# Patient Record
Sex: MALE | Race: BLACK OR AFRICAN AMERICAN | Employment: FULL TIME | URBAN - METROPOLITAN AREA
[De-identification: names, ages, dates, MRNs, and addresses within clinical notes are randomized per-mention and may not be internally consistent; named-entity substitution may affect disease eponyms.]

---

## 2017-02-20 ENCOUNTER — HOSPITAL ENCOUNTER (EMERGENCY)
Age: 27
Discharge: HOME | End: 2017-02-20
Attending: EMERGENCY MEDICINE
Payer: COMMERCIAL

## 2017-02-20 VITALS
BODY MASS INDEX: 28.84 KG/M2 | TEMPERATURE: 101 F | SYSTOLIC BLOOD PRESSURE: 128 MMHG | OXYGEN SATURATION: 95 % | DIASTOLIC BLOOD PRESSURE: 79 MMHG | HEIGHT: 71 IN | HEART RATE: 86 BPM | WEIGHT: 206 LBS | RESPIRATION RATE: 16 BRPM

## 2017-02-20 DIAGNOSIS — R50.9 FEVER, UNSPECIFIED FEVER CAUSE: ICD-10-CM

## 2017-02-20 DIAGNOSIS — J06.9 VIRAL UPPER RESPIRATORY TRACT INFECTION: Primary | ICD-10-CM

## 2017-02-20 PROCEDURE — 94640 AIRWAY INHALATION TREATMENT: CPT

## 2017-02-20 PROCEDURE — 99283 EMERGENCY DEPT VISIT LOW MDM: CPT

## 2017-02-20 PROCEDURE — 74011250637 HC RX REV CODE- 250/637: Performed by: EMERGENCY MEDICINE

## 2017-02-20 RX ORDER — IBUPROFEN 400 MG/1
800 TABLET ORAL
Status: COMPLETED | OUTPATIENT
Start: 2017-02-20 | End: 2017-02-20

## 2017-02-20 RX ORDER — ALBUTEROL SULFATE 90 UG/1
2 AEROSOL, METERED RESPIRATORY (INHALATION)
Status: COMPLETED | OUTPATIENT
Start: 2017-02-20 | End: 2017-02-20

## 2017-02-20 RX ORDER — HYDROCODONE POLISTIREX AND CHLORPHENIRAMINE POLISTIREX 10; 8 MG/5ML; MG/5ML
5 SUSPENSION, EXTENDED RELEASE ORAL
Qty: 115 ML | Refills: 0 | Status: SHIPPED | OUTPATIENT
Start: 2017-02-20 | End: 2018-07-15

## 2017-02-20 RX ORDER — ACETAMINOPHEN 500 MG
1000 TABLET ORAL
Status: COMPLETED | OUTPATIENT
Start: 2017-02-20 | End: 2017-02-20

## 2017-02-20 RX ORDER — PSEUDOEPHEDRINE HCL 30 MG
30 TABLET ORAL
Qty: 20 TAB | Refills: 0 | Status: SHIPPED | OUTPATIENT
Start: 2017-02-20 | End: 2018-07-15

## 2017-02-20 RX ORDER — ALBUTEROL SULFATE 90 UG/1
2 AEROSOL, METERED RESPIRATORY (INHALATION)
Qty: 1 INHALER | Refills: 0 | Status: SHIPPED | OUTPATIENT
Start: 2017-02-20 | End: 2020-02-26

## 2017-02-20 RX ADMIN — ALBUTEROL SULFATE 2 PUFF: 90 AEROSOL, METERED RESPIRATORY (INHALATION) at 19:31

## 2017-02-20 RX ADMIN — ACETAMINOPHEN 1000 MG: 500 TABLET, FILM COATED ORAL at 19:01

## 2017-02-20 RX ADMIN — IBUPROFEN 800 MG: 400 TABLET ORAL at 19:01

## 2017-02-20 NOTE — ED PROVIDER NOTES
HPI Comments: 6:12 PM Fatuma Pink is a 27 y.o. male with a h/o Smoking 1.5PPD presents to the ED with c/o URI onset 4 days ago. Pt states he used Dayquil, Mucinex, Ibuprofen 5 hrs ago, and Baking Soda for his abd pain. Pt reports malaise, dry cough, fever of 104F, chills, and light-headedness. Pt denies nausea, vomiting, diarrhea, chest pain, or SOB. All other sx denied. No other complaints at this time. PCP-None      Patient is a 27 y.o. male presenting with sore throat, chills, general illness, and cough. The history is provided by the patient. Sore Throat    Associated symptoms include cough. Pertinent negatives include no vomiting, no congestion, no headaches, no shortness of breath and no trouble swallowing. Chills    Associated symptoms include sore throat and cough. Pertinent negatives include no chest pain, no vomiting, no congestion, no headaches, no shortness of breath and no neck pain. Generalized Body Aches   Associated symptoms include abdominal pain. Pertinent negatives include no chest pain, no headaches and no shortness of breath. Cough   Associated symptoms include chills and sore throat. Pertinent negatives include no chest pain, no headaches, no myalgias, no shortness of breath, no wheezing, no nausea and no vomiting. Past Medical History:   Diagnosis Date    PTSD (post-traumatic stress disorder)        History reviewed. No pertinent past surgical history. No family history on file. Social History     Social History    Marital status: SINGLE     Spouse name: N/A    Number of children: N/A    Years of education: N/A     Occupational History    Not on file.      Social History Main Topics    Smoking status: Current Every Day Smoker     Packs/day: 1.50     Years: 17.00    Smokeless tobacco: Not on file    Alcohol use Yes    Drug use: No    Sexual activity: Yes     Other Topics Concern    Not on file     Social History Narrative         ALLERGIES: Review of patient's allergies indicates no known allergies. Review of Systems   Constitutional: Positive for chills and fever. Negative for appetite change. HENT: Positive for sore throat. Negative for congestion, sinus pressure and trouble swallowing. Eyes: Negative for pain and visual disturbance. Respiratory: Positive for cough. Negative for chest tightness, shortness of breath and wheezing. Cardiovascular: Negative for chest pain, palpitations and leg swelling. Gastrointestinal: Positive for abdominal pain. Negative for nausea and vomiting. Endocrine: Negative. Genitourinary: Negative. Musculoskeletal: Negative for arthralgias, back pain, myalgias and neck pain. Skin: Negative. Allergic/Immunologic: Negative. Neurological: Positive for light-headedness. Negative for dizziness, syncope, numbness and headaches. Hematological: Negative. Psychiatric/Behavioral: Negative. All other systems reviewed and are negative. Vitals:    02/20/17 1810   BP: 128/79   Pulse: 86   Resp: 16   Temp: (!) 101.9 °F (38.8 °C)   SpO2: 95%   Weight: 93.4 kg (206 lb)   Height: 5' 11\" (1.803 m)            Physical Exam   Constitutional: He is oriented to person, place, and time. He appears well-developed and well-nourished. No distress. Non meningismus. HENT:   Head: Normocephalic and atraumatic. Right Ear: External ear normal. A middle ear effusion is present. Left Ear: External ear normal. A middle ear effusion is present. Mouth/Throat: Oropharynx is clear and moist and mucous membranes are normal. No trismus in the jaw. No oropharyngeal exudate, posterior oropharyngeal edema, posterior oropharyngeal erythema or tonsillar abscesses. Eyes: Conjunctivae and EOM are normal. Pupils are equal, round, and reactive to light. Neck: Normal range of motion. Neck supple. No JVD present. No tracheal deviation present. No thyromegaly present. No cervical lymphadenopathy.     Cardiovascular: Normal rate, regular rhythm and normal heart sounds. Exam reveals no gallop and no friction rub. No murmur heard. Pulmonary/Chest: Effort normal and breath sounds normal. No respiratory distress. He has no decreased breath sounds. He has no wheezes. He has no rales. He exhibits no tenderness. Abdominal: Soft. Bowel sounds are normal. He exhibits no distension. There is no tenderness. There is no rebound. Musculoskeletal: Normal range of motion. He exhibits no edema or tenderness. Lymphadenopathy:     He has no cervical adenopathy. Neurological: He is alert and oriented to person, place, and time. No cranial nerve deficit. Skin: Skin is warm. He is not diaphoretic. Psychiatric: He has a normal mood and affect. His behavior is normal. Judgment and thought content normal.   Nursing note and vitals reviewed. MDM  Number of Diagnoses or Management Options  Fever, unspecified fever cause:   Viral upper respiratory tract infection:   Diagnosis management comments: 6:21 PM  URI congestion body aches, and persistent fever    Tx regiment for taking Motrin and Tylenol simultaneously discussed. Pseudofed and albuterol added to tx plan. ED Course       Procedures    Vitals:  Patient Vitals for the past 12 hrs:   Temp Pulse Resp BP SpO2   17 1810 (!) 101.9 °F (38.8 °C) 86 16 128/79 95 %         Medications ordered:   Medications   ibuprofen (MOTRIN) tablet 800 mg (not administered)   acetaminophen (TYLENOL) tablet 1,000 mg (not administered)         Lab findings:  No results found for this or any previous visit (from the past 12 hour(s)). X-Ray, CT or other radiology findings or impressions:  No orders to display       Orders:  Orders Placed This Encounter    inhalational spacing device     Si Each by Does Not Apply route as needed for Cough (with inhaler).      Dispense:  1 Device     Refill:  0    chlorpheniramine-HYDROcodone (TUSSIONEX PENNKINETIC ER) 10-8 mg/5 mL suspension     Sig: Take 5 mL by mouth every twelve (12) hours as needed for Cough. Max Daily Amount: 10 mL. Dispense:  115 mL     Refill:  0    albuterol (PROVENTIL HFA, VENTOLIN HFA, PROAIR HFA) 90 mcg/actuation inhaler     Sig: Take 2 Puffs by inhalation every four (4) hours as needed for Wheezing. Dispense:  1 Inhaler     Refill:  0    pseudoephedrine (SUDAFED) 30 mg tablet     Sig: Take 1 Tab by mouth every six (6) hours as needed for Congestion. Dispense:  20 Tab     Refill:  0    ibuprofen (MOTRIN) tablet 800 mg    acetaminophen (TYLENOL) tablet 1,000 mg       Reevaluation, Progress notes, Consult notes, or additional Procedure notes:   6:58 PM I have assessed the patient who will be discharged in stable condition. Patient is to return to emergency department if any new or worsening condition. I have given the patient instructions for discharge and follow-up. Patient understands and verbalizes agreement with plan, diagnosis, discharge, and follow-up. Disposition:  Diagnosis:   1. Viral upper respiratory tract infection    2. Fever, unspecified fever cause        Disposition:     Follow-up Information     Follow up With Details Comments Contact Info    None   None ([phone]) Patient stated that they have no PCP      [facility] Call Follow Up From Emergency Department [address]  [address] [phone]           Patient's Medications   Start Taking    ALBUTEROL (PROVENTIL HFA, VENTOLIN HFA, PROAIR HFA) 90 MCG/ACTUATION INHALER    Take 2 Puffs by inhalation every four (4) hours as needed for Wheezing. CHLORPHENIRAMINE-HYDROCODONE (TUSSIONEX PENNKINETIC ER) 10-8 MG/5 ML SUSPENSION    Take 5 mL by mouth every twelve (12) hours as needed for Cough. Max Daily Amount: 10 mL. INHALATIONAL SPACING DEVICE    1 Each by Does Not Apply route as needed for Cough (with inhaler).     PSEUDOEPHEDRINE (SUDAFED) 30 MG TABLET    Take 1 Tab by mouth every six (6) hours as needed for Congestion. Continue Taking    METHOCARBAMOL (ROBAXIN-750) 750 MG TABLET    Take 1 Tab by mouth three (3) times daily. These Medications have changed    No medications on file   Stop Taking    No medications on file     Bhanu Del Real for and in the presence of Crosby Gowers, MD 6:12 PM, 02/20/17. Physician Attestation  I personally performed the services described in the documentation, reviewed the documentation, as recorded by the scribe in my presence, and it accurately and completely records my words and actions.     Crosby Gowers, MD 6:12 PM 02/20/17        Signed by : Bhanu Vinson, 02/20/17 at 6:12 PM

## 2017-02-21 NOTE — DISCHARGE INSTRUCTIONS
Learning About Fever  What is a fever? A fever is a high body temperature. It's one way your body fights being sick. A fever shows that the body is responding to infection or other illnesses, both minor and severe. A fever is a symptom, not an illness by itself. A fever can be a sign that you are ill, but most fevers are not caused by a serious problem. You may have a fever with a minor illness, such as a cold. But sometimes a very serious infection may cause little or no fever. It is important to look at other symptoms, other conditions you have, and how you feel in general. In children, notice how they act and see what symptoms they complain of. What is a normal body temperature? A normal body temperature is about 98. 6ºF. Some people have a normal temperature that is a little higher or a little lower than this. Your temperature may be a little lower in the morning than it is later in the day. It may go up during hot weather or when you exercise, wear heavy clothes, or take a hot bath. Your temperature may also be different depending on how you take it. A temperature taken in the mouth (oral) or under the arm may be a little lower than your core temperature (rectal). What is a fever temperature? A core temperature of 100.4°F or above is considered a fever. What can cause a fever? A fever may be caused by:  · Infections. This is the most common cause of a fever. Examples of infections that can cause a fever include the flu, a kidney infection, or pneumonia. · Some medicines. · Severe trauma or injury, such as a heart attack, stroke, heatstroke, or burns. · Other medical conditions, such as arthritis and some cancers. How can you treat a fever at home? · Ask your doctor if you can take an over-the-counter pain medicine, such as acetaminophen (Tylenol), ibuprofen (Advil, Motrin), or naproxen (Aleve). Be safe with medicines. Read and follow all instructions on the label.   · To prevent dehydration, drink plenty of fluids. Choose water and other caffeine-free clear liquids until you feel better. If you have kidney, heart, or liver disease and have to limit fluids, talk with your doctor before you increase the amount of fluids you drink. Follow-up care is a key part of your treatment and safety. Be sure to make and go to all appointments, and call your doctor if you are having problems. It's also a good idea to know your test results and keep a list of the medicines you take. Where can you learn more? Go to http://sg-naa.info/. Enter D099 in the search box to learn more about \"Learning About Fever. \"  Current as of: May 27, 2016  Content Version: 11.1  © 2269-2128 [employer]. Care instructions adapted under license by [employer] (which disclaims liability or warranty for this information). If you have questions about a medical condition or this instruction, always ask your healthcare professional. [place] [address] any warranty or liability for your use of this information. Upper Respiratory Infection (Cold): Care Instructions  Your Care Instructions    An upper respiratory infection, or URI, is an infection of the nose, sinuses, or throat. URIs are spread by coughs, sneezes, and direct contact. The common cold is the most frequent kind of URI. The flu and sinus infections are other kinds of URIs. Almost all URIs are caused by viruses. Antibiotics won't cure them. But you can treat most infections with home care. This may include drinking lots of fluids and taking over-the-counter pain medicine. You will probably feel better in 4 to 10 days. The doctor has checked you carefully, but problems can develop later. If you notice any problems or new symptoms, get medical treatment right away. Follow-up care is a key part of your treatment and safety.  Be sure to make and go to all appointments, and call your doctor if you are having problems. It's also a good idea to know your test results and keep a list of the medicines you take. How can you care for yourself at home? · To prevent dehydration, drink plenty of fluids, enough so that your urine is light yellow or clear like water. Choose water and other caffeine-free clear liquids until you feel better. If you have kidney, heart, or liver disease and have to limit fluids, talk with your doctor before you increase the amount of fluids you drink. · Take an over-the-counter pain medicine, such as acetaminophen (Tylenol), ibuprofen (Advil, Motrin), or naproxen (Aleve). Read and follow all instructions on the label. · Before you use cough and cold medicines, check the label. These medicines may not be safe for young children or for people with certain health problems. · Be careful when taking over-the-counter cold or flu medicines and Tylenol at the same time. Many of these medicines have acetaminophen, which is Tylenol. Read the labels to make sure that you are not taking more than the recommended dose. Too much acetaminophen (Tylenol) can be harmful. · Get plenty of rest.  · Do not smoke or allow others to smoke around you. If you need help quitting, talk to your doctor about stop-smoking programs and medicines. These can increase your chances of quitting for good. When should you call for help? Call 911 anytime you think you may need emergency care. For example, call if:  · You have severe trouble breathing. Call your doctor now or seek immediate medical care if:  · You seem to be getting much sicker. · You have new or worse trouble breathing. · You have a new or higher fever. · You have a new rash. Watch closely for changes in your health, and be sure to contact your doctor if:  · You have a new symptom, such as a sore throat, an earache, or sinus pain. · You cough more deeply or more often, especially if you notice more mucus or a change in the color of your mucus.   · You do not get better as expected. Where can you learn more? Go to http://sg-naa.info/. Enter M718 in the search box to learn more about \"Upper Respiratory Infection (Cold): Care Instructions. \"  Current as of: June 30, 2016  Content Version: 11.1  © 3213-4542 [employer]. Care instructions adapted under license by [employer] (which disclaims liability or warranty for this information). If you have questions about a medical condition or this instruction, always ask your healthcare professional. [address] any warranty or liability for your use of this information.

## 2017-07-19 ENCOUNTER — APPOINTMENT (OUTPATIENT)
Dept: GENERAL RADIOLOGY | Age: 27
End: 2017-07-19
Attending: PHYSICIAN ASSISTANT
Payer: COMMERCIAL

## 2017-07-19 ENCOUNTER — HOSPITAL ENCOUNTER (EMERGENCY)
Age: 27
Discharge: HOME | End: 2017-07-19
Attending: EMERGENCY MEDICINE
Payer: COMMERCIAL

## 2017-07-19 VITALS
TEMPERATURE: 98.5 F | HEART RATE: 56 BPM | SYSTOLIC BLOOD PRESSURE: 102 MMHG | BODY MASS INDEX: 28.82 KG/M2 | HEIGHT: 71 IN | RESPIRATION RATE: 18 BRPM | DIASTOLIC BLOOD PRESSURE: 81 MMHG | OXYGEN SATURATION: 100 % | WEIGHT: 205.9 LBS

## 2017-07-19 DIAGNOSIS — K62.5 RECTAL BLEEDING: Primary | ICD-10-CM

## 2017-07-19 DIAGNOSIS — K92.1 BLOODY STOOL: ICD-10-CM

## 2017-07-19 DIAGNOSIS — K59.00 CONSTIPATION, UNSPECIFIED CONSTIPATION TYPE: ICD-10-CM

## 2017-07-19 LAB
ANION GAP BLD CALC-SCNC: 5 MMOL/L (ref 3–18)
APTT PPP: 30 SEC (ref 23–36.4)
BASOPHILS # BLD AUTO: 0 K/UL (ref 0–0.06)
BASOPHILS # BLD: 1 % (ref 0–2)
BUN SERPL-MCNC: 22 MG/DL (ref 7–18)
BUN/CREAT SERPL: 19 (ref 12–20)
CALCIUM SERPL-MCNC: 9.2 MG/DL (ref 8.5–10.1)
CHLORIDE SERPL-SCNC: 107 MMOL/L (ref 100–108)
CO2 SERPL-SCNC: 26 MMOL/L (ref 21–32)
CREAT SERPL-MCNC: 1.18 MG/DL (ref 0.6–1.3)
DIFFERENTIAL METHOD BLD: NORMAL
EOSINOPHIL # BLD: 0.3 K/UL (ref 0–0.4)
EOSINOPHIL NFR BLD: 4 % (ref 0–5)
ERYTHROCYTE [DISTWIDTH] IN BLOOD BY AUTOMATED COUNT: 12.6 % (ref 11.6–14.5)
GLUCOSE SERPL-MCNC: 108 MG/DL (ref 74–99)
HCT VFR BLD AUTO: 42.6 % (ref 36–48)
HGB BLD-MCNC: 14 G/DL (ref 13–16)
INR PPP: 0.9 (ref 0.8–1.2)
LYMPHOCYTES # BLD AUTO: 29 % (ref 21–52)
LYMPHOCYTES # BLD: 1.9 K/UL (ref 0.9–3.6)
MCH RBC QN AUTO: 28.7 PG (ref 24–34)
MCHC RBC AUTO-ENTMCNC: 32.9 G/DL (ref 31–37)
MCV RBC AUTO: 87.3 FL (ref 74–97)
MONOCYTES # BLD: 0.5 K/UL (ref 0.05–1.2)
MONOCYTES NFR BLD AUTO: 7 % (ref 3–10)
NEUTS SEG # BLD: 3.9 K/UL (ref 1.8–8)
NEUTS SEG NFR BLD AUTO: 59 % (ref 40–73)
PLATELET # BLD AUTO: 201 K/UL (ref 135–420)
PMV BLD AUTO: 10.1 FL (ref 9.2–11.8)
POTASSIUM SERPL-SCNC: 4 MMOL/L (ref 3.5–5.5)
PROTHROMBIN TIME: 12 SEC (ref 11.5–15.2)
RBC # BLD AUTO: 4.88 M/UL (ref 4.7–5.5)
SODIUM SERPL-SCNC: 138 MMOL/L (ref 136–145)
WBC # BLD AUTO: 6.6 K/UL (ref 4.6–13.2)

## 2017-07-19 PROCEDURE — 85610 PROTHROMBIN TIME: CPT | Performed by: EMERGENCY MEDICINE

## 2017-07-19 PROCEDURE — 74020 XR ABD FLAT/ ERECT: CPT

## 2017-07-19 PROCEDURE — 80048 BASIC METABOLIC PNL TOTAL CA: CPT | Performed by: EMERGENCY MEDICINE

## 2017-07-19 PROCEDURE — 99282 EMERGENCY DEPT VISIT SF MDM: CPT

## 2017-07-19 PROCEDURE — 85025 COMPLETE CBC W/AUTO DIFF WBC: CPT | Performed by: EMERGENCY MEDICINE

## 2017-07-19 PROCEDURE — 85730 THROMBOPLASTIN TIME PARTIAL: CPT | Performed by: EMERGENCY MEDICINE

## 2017-07-19 RX ORDER — POLYETHYLENE GLYCOL 3350 17 G/17G
17 POWDER, FOR SOLUTION ORAL DAILY
Qty: 289 G | Refills: 0 | Status: SHIPPED | OUTPATIENT
Start: 2017-07-19 | End: 2018-07-15

## 2017-07-19 RX ORDER — HYDROCORTISONE ACETATE 25 MG/1
25 SUPPOSITORY RECTAL EVERY 12 HOURS
Qty: 12 EACH | Refills: 0 | Status: SHIPPED | OUTPATIENT
Start: 2017-07-19 | End: 2018-07-15

## 2017-07-19 NOTE — ED NOTES
Provider in triage: 27 y o with rectal bleeding for three days, shows picture of toilet water tinged with red blood. No lightheadedness.    Ordered: labs  DDx: rectal bleed, given age, more likely hemorrhoid or benign process  Sent to (MAIN treatment area, FAST track): fast

## 2017-07-19 NOTE — ED NOTES
Pt stable observed looking at movie on phone, no distress noted and no complaints voiced.  Pt awaiting discharge

## 2017-07-19 NOTE — ED PROVIDER NOTES
HPI Comments: 27 yr old male presents to the ED complaining of episodes of bright red bloody stool and rectal bleeding with bowel movements x 3 days. Pt states he has had some mild discomfort in the center of the abdomen off and on for a few days. Denies diarrhea or constipation. No other complaints. Patient is a 27 y.o. male presenting with melena. Melena   Associated symptoms include abdominal pain. Pertinent negatives include no chest pain, no headaches and no shortness of breath. Past Medical History:   Diagnosis Date    PTSD (post-traumatic stress disorder)        Past Surgical History:   Procedure Laterality Date    HX ORTHOPAEDIC      nose reconstruction    HX OTHER SURGICAL      testicle surgery         History reviewed. No pertinent family history. Social History     Social History    Marital status: SINGLE     Spouse name: N/A    Number of children: N/A    Years of education: N/A     Occupational History    Not on file. Social History Main Topics    Smoking status: Current Every Day Smoker     Packs/day: 1.50     Years: 17.00    Smokeless tobacco: Current User    Alcohol use Yes    Drug use: No    Sexual activity: Yes     Partners: Female     Other Topics Concern    Not on file     Social History Narrative         ALLERGIES: Review of patient's allergies indicates no known allergies. Review of Systems   Constitutional: Negative. Negative for chills, diaphoresis, fatigue and fever. HENT: Negative. Negative for congestion, ear pain, rhinorrhea and sore throat. Eyes: Negative. Negative for pain and redness. Respiratory: Negative. Negative for cough, shortness of breath, wheezing and stridor. Cardiovascular: Negative. Negative for chest pain, palpitations and leg swelling. Gastrointestinal: Positive for abdominal pain, anal bleeding, blood in stool and melena. Negative for constipation, diarrhea, nausea and vomiting. Endocrine: Negative.     Genitourinary: Negative. Negative for dysuria, flank pain, frequency and hematuria. Musculoskeletal: Negative. Negative for back pain, myalgias, neck pain and neck stiffness. Skin: Negative. Negative for rash and wound. Allergic/Immunologic: Negative. Neurological: Negative. Negative for dizziness, seizures, syncope and headaches. Hematological: Negative. Psychiatric/Behavioral: Negative. All other systems reviewed and are negative. Vitals:    07/19/17 1154   BP: 130/85   Pulse: 60   Resp: 20   Temp: 98.5 °F (36.9 °C)   SpO2: 99%   Weight: 93.4 kg (205 lb 14.4 oz)   Height: 5' 11\" (1.803 m)            Physical Exam   Constitutional: He is oriented to person, place, and time. He appears well-developed and well-nourished. No distress. HENT:   Head: Normocephalic. Neck: Normal range of motion. Neck supple. Cardiovascular: Normal rate, regular rhythm and normal heart sounds. Exam reveals no gallop and no friction rub. No murmur heard. Pulmonary/Chest: Effort normal and breath sounds normal. No stridor. No respiratory distress. He has no wheezes. He has no rales. Abdominal: Soft. Bowel sounds are normal. He exhibits no distension and no mass. There is no tenderness. There is no rebound and no guarding. Genitourinary: Rectal exam shows guaiac positive stool. Genitourinary Comments: No external hemorrhoids or evidence of impaction noted on rectal exam, guaiac positive, no grossly visible blood noted   Musculoskeletal: Normal range of motion. Neurological: He is alert and oriented to person, place, and time. Coordination normal.   Skin: Skin is warm and dry. No rash noted. He is not diaphoretic. No erythema. Psychiatric: He has a normal mood and affect. His behavior is normal. Thought content normal.   Nursing note and vitals reviewed.        MDM  Number of Diagnoses or Management Options  Diagnosis management comments: Impression:  Rectal bleeding, constipation    CBC unremarkable, glucose 108, BUN 22, PT/aPTT/INR unremarkable  X-ray abd moderate amount of stool burden noted throughout the colon    Explained to the pt that he likely has internal hemorrhoids related to constipation, no anemia noted on labs, will recommend close GI follow-up. Patient is stable for discharge at this time. Rx for miralax and anusol suppositories given. Rest and follow-up with GI this week. Return to the ED immediately for any new or worsening sx.   Agueda Stevens PA-C 1:58 PM            Amount and/or Complexity of Data Reviewed  Clinical lab tests: ordered and reviewed  Tests in the radiology section of CPT®: reviewed and ordered    Risk of Complications, Morbidity, and/or Mortality  Presenting problems: low  Diagnostic procedures: low  Management options: low    Patient Progress  Patient progress: stable    ED Course       Procedures

## 2017-07-19 NOTE — ED NOTES
I have reviewed discharge instructions with the patient. The patient verbalized understanding.   Iv discontinued, pt ambulated out of ED in stable condition with no distress noted and no complaints voiced

## 2017-07-19 NOTE — DISCHARGE INSTRUCTIONS
Constipation: Care Instructions  Your Care Instructions  Constipation means that you have a hard time passing stools (bowel movements). People pass stools from 3 times a day to once every 3 days. What is normal for you may be different. Constipation may occur with pain in the rectum and cramping. The pain may get worse when you try to pass stools. Sometimes there are small amounts of bright red blood on toilet paper or the surface of stools. This is because of enlarged veins near the rectum (hemorrhoids). A few changes in your diet and lifestyle may help you avoid ongoing constipation. Your doctor may also prescribe medicine to help loosen your stool. Some medicines can cause constipation. These include pain medicines and antidepressants. Tell your doctor about all the medicines you take. Your doctor may want to make a medicine change to ease your symptoms. Follow-up care is a key part of your treatment and safety. Be sure to make and go to all appointments, and call your doctor if you are having problems. It's also a good idea to know your test results and keep a list of the medicines you take. How can you care for yourself at home? · Drink plenty of fluids, enough so that your urine is light yellow or clear like water. If you have kidney, heart, or liver disease and have to limit fluids, talk with your doctor before you increase the amount of fluids you drink. · Include high-fiber foods in your diet each day. These include fruits, vegetables, beans, and whole grains. · Get at least 30 minutes of exercise on most days of the week. Walking is a good choice. You also may want to do other activities, such as running, swimming, cycling, or playing tennis or team sports. · Take a fiber supplement, such as Citrucel or Metamucil, every day. Read and follow all instructions on the label. · Schedule time each day for a bowel movement. A daily routine may help.  Take your time having your bowel movement. · Support your feet with a small step stool when you sit on the toilet. This helps flex your hips and places your pelvis in a squatting position. · Your doctor may recommend an over-the-counter laxative to relieve your constipation. Examples are Milk of Magnesia and MiraLax. Read and follow all instructions on the label. Do not use laxatives on a long-term basis. When should you call for help? Call your doctor now or seek immediate medical care if:  · You have new or worse belly pain. · You have new or worse nausea or vomiting. · You have blood in your stools. Watch closely for changes in your health, and be sure to contact your doctor if:  · Your constipation is getting worse. · You do not get better as expected. Where can you learn more? Go to http://sg-naa.info/. Enter 21 [phone] in the search box to learn more about \"Constipation: Care Instructions. \"  Current as of: March 20, 2017  Content Version: 11.3  © 4186-3016 [employer]. Care instructions adapted under license by [employer] (which disclaims liability or warranty for this information). If you have questions about a medical condition or this instruction, always ask your healthcare professional. [address] any warranty or liability for your use of this information. StudentFunder Activation    Thank you for requesting access to Student[employer]under. Please follow the instructions below to securely access and download your online medical record. StudentFunder allows you to send messages to your doctor, view your test results, renew your prescriptions, schedule appointments, and more. How Do I Sign Up? 1. In your internet browser, go to [email or web address]  2. Click on the First Time User? Click Here link in the Sign In box. You will be redirect to the New Member Sign Up page. 3. Enter your StudentFunder Access Code exactly as it appears below.  You will not need to use this code after youve completed the sign-up process. If you do not sign up before the expiration date, you must request a new code. Yoomly Access Code: [email or web address]  Expires: 10/17/2017  1:59 PM (This is the date your Yoomly access code will )    4. Enter the last four digits of your Social Security Number (xxxx) and Date of Birth (mm/dd/yyyy) as indicated and click Submit. You will be taken to the next sign-up page. 5. Create a Yoomly ID. This will be your Yoomly login ID and cannot be changed, so think of one that is secure and easy to remember. 6. Create a Yoomly password. You can change your password at any time. 7. Enter your Password Reset Question and Answer. This can be used at a later time if you forget your password. 8. Enter your e-mail address. You will receive e-mail notification when new information is available in [address]. 9. Click Sign Up. You can now view and download portions of your medical record. 10. Click the Download Summary menu link to download a portable copy of your medical information. Additional Information    If you have questions, please visit the Frequently Asked Questions section of the Yoomly website at [email or web address] Remember, Yoomly is NOT to be used for urgent needs. For medical emergencies, dial 911.

## 2018-01-03 ENCOUNTER — HOSPITAL ENCOUNTER (EMERGENCY)
Age: 28
Discharge: HOME | End: 2018-01-03
Attending: EMERGENCY MEDICINE | Admitting: EMERGENCY MEDICINE
Payer: COMMERCIAL

## 2018-01-03 VITALS
HEIGHT: 71 IN | DIASTOLIC BLOOD PRESSURE: 81 MMHG | TEMPERATURE: 98.3 F | SYSTOLIC BLOOD PRESSURE: 136 MMHG | WEIGHT: 218.6 LBS | RESPIRATION RATE: 18 BRPM | BODY MASS INDEX: 30.6 KG/M2 | HEART RATE: 88 BPM | OXYGEN SATURATION: 100 %

## 2018-01-03 DIAGNOSIS — Z00.00 ENCOUNTER FOR WELL ADULT EXAM WITHOUT ABNORMAL FINDINGS: Primary | ICD-10-CM

## 2018-01-03 LAB
APPEARANCE UR: CLEAR
BILIRUB UR QL: NEGATIVE
COLOR UR: YELLOW
GLUCOSE UR STRIP.AUTO-MCNC: NEGATIVE MG/DL
HGB UR QL STRIP: NEGATIVE
KETONES UR QL STRIP.AUTO: NEGATIVE MG/DL
LEUKOCYTE ESTERASE UR QL STRIP.AUTO: NEGATIVE
NITRITE UR QL STRIP.AUTO: NEGATIVE
PH UR STRIP: 5.5 [PH] (ref 5–8)
PROT UR STRIP-MCNC: NEGATIVE MG/DL
SP GR UR REFRACTOMETRY: 1.03 (ref 1–1.03)
UROBILINOGEN UR QL STRIP.AUTO: 0.2 EU/DL (ref 0.2–1)

## 2018-01-03 PROCEDURE — 99283 EMERGENCY DEPT VISIT LOW MDM: CPT

## 2018-01-03 PROCEDURE — 87491 CHLMYD TRACH DNA AMP PROBE: CPT | Performed by: PHYSICIAN ASSISTANT

## 2018-01-03 PROCEDURE — 81003 URINALYSIS AUTO W/O SCOPE: CPT | Performed by: PHYSICIAN ASSISTANT

## 2018-01-03 NOTE — ED PROVIDER NOTES
HPI Comments: Jeyson Serrano is a 32 y.o. Male with hx of PTSD  that presents to the ED with a complaint of concern for STD. Pt states that he SO has been dealing with BV and yeast infections for the past month and he is concerned that he may have something or may have caught something from her. Pt states that he has some itching 'every now and then' and that he has been going to the bathroom more often for the past couple weeks. He denies painful urination, discharge, abdominal pain, fever, NVD, CP and SOB. Patient is a 32 y.o. male presenting with STD exposure. Exposure to STD   Pertinent negatives include no chest pain, no abdominal pain, no headaches and no shortness of breath. Past Medical History:   Diagnosis Date    PTSD (post-traumatic stress disorder)        Past Surgical History:   Procedure Laterality Date    HX ORTHOPAEDIC      nose reconstruction    HX OTHER SURGICAL      testicle surgery         No family history on file. Social History     Social History    Marital status: SINGLE     Spouse name: N/A    Number of children: N/A    Years of education: N/A     Occupational History    Not on file. Social History Main Topics    Smoking status: Current Every Day Smoker     Packs/day: 1.50     Years: 17.00    Smokeless tobacco: Current User    Alcohol use Yes    Drug use: No    Sexual activity: Yes     Partners: Female     Other Topics Concern    Not on file     Social History Narrative         ALLERGIES: Review of patient's allergies indicates no known allergies. Review of Systems   Constitutional: Negative for fatigue and fever. HENT: Negative for congestion. Respiratory: Negative for shortness of breath. Cardiovascular: Negative for chest pain. Gastrointestinal: Negative for abdominal pain, diarrhea, nausea and vomiting. Genitourinary: Positive for frequency. Negative for difficulty urinating and flank pain.    Musculoskeletal: Negative for back pain.   Skin: Negative for wound. Neurological: Negative for dizziness and headaches. All other systems reviewed and are negative. Vitals:    01/03/18 1729   BP: 136/81   Pulse: 88   Resp: 18   Temp: 98.3 °F (36.8 °C)   SpO2: 100%   Weight: 99.2 kg (218 lb 9.6 oz)   Height: 5' 11\" (1.803 m)            Physical Exam   Constitutional: He is oriented to person, place, and time. He appears well-developed and well-nourished. No distress. HENT:   Head: Normocephalic and atraumatic. Nose: Nose normal.   Eyes: Conjunctivae are normal. Pupils are equal, round, and reactive to light. Neck: Normal range of motion. Cardiovascular: Normal rate, regular rhythm and normal heart sounds. Pulmonary/Chest: Effort normal and breath sounds normal. No respiratory distress. He has no wheezes. Abdominal: Bowel sounds are normal. There is no tenderness. Genitourinary: Testes normal and penis normal. Circumcised. No phimosis, paraphimosis, hypospadias, penile erythema or penile tenderness. Musculoskeletal: Normal range of motion. Neurological: He is alert and oriented to person, place, and time. Skin: Skin is warm and dry. Psychiatric: He has a normal mood and affect. Vitals reviewed. MDM  Number of Diagnoses or Management Options  Encounter for well adult exam without abnormal findings:   Diagnosis management comments: Labs Reviewed  URINALYSIS W/ RFLX MICROSCOPIC  CHLAMYDIA/NEISSERIA AMPLIFICATION      Discussed etiology of BV and Yeast infections with pt and SO. Discussed prophylactic treatment for G/C,  SO states last G/C was negative. Pt states declines treatment at this time.       Impression: well adult exam    Plan: discharge home  We will call with results of pending labs  Follow up with PCP       Amount and/or Complexity of Data Reviewed  Clinical lab tests: ordered and reviewed    Risk of Complications, Morbidity, and/or Mortality  Presenting problems: low  Diagnostic procedures: low  Management options: low    Patient Progress  Patient progress: stable    ED Course       Procedures                   Vitals:  Patient Vitals for the past 12 hrs:   Temp Pulse Resp BP SpO2   01/03/18 1729 98.3 °F (36.8 °C) 88 18 136/81 100 %         Medications ordered:   Medications - No data to display      Lab findings:  Recent Results (from the past 12 hour(s))   URINALYSIS W/ RFLX MICROSCOPIC    Collection Time: 01/03/18  5:35 PM   Result Value Ref Range    Color YELLOW      Appearance CLEAR      Specific gravity 1.029 1.005 - 1.030      pH (UA) 5.5 5.0 - 8.0      Protein NEGATIVE  NEG mg/dL    Glucose NEGATIVE  NEG mg/dL    Ketone NEGATIVE  NEG mg/dL    Bilirubin NEGATIVE  NEG      Blood NEGATIVE  NEG      Urobilinogen 0.2 0.2 - 1.0 EU/dL    Nitrites NEGATIVE  NEG      Leukocyte Esterase NEGATIVE  NEG             X-Ray, CT or other radiology findings or impressions:  No orders to display       Progress notes, Consult notes or additional Procedure notes:       Disposition:  Diagnosis:   1. Encounter for well adult exam without abnormal findings        Disposition: discharge    Follow-up Information     Follow up With Details Comments [facility] Call As needed, follow up [address] [facility] EM[facility]PT  If symptoms worsen [address] [place] [address] [address] [address]  [phone]           Patient's Medications   Start Taking    No medications on file   Continue Taking    ALBUTEROL (PROVENTIL HFA, VENTOLIN HFA, PROAIR HFA) 90 MCG/ACTUATION INHALER    Take 2 Puffs by inhalation every four (4) hours as needed for Wheezing. CHLORPHENIRAMINE-HYDROCODONE (TUSSIONEX PENNKINETIC ER) 10-8 MG/5 ML SUSPENSION    Take 5 mL by mouth every twelve (12) hours as needed for Cough. Max Daily Amount: 10 mL. HYDROCORTISONE (ANUSOL-HC) 25 MG SUPP    Insert 1 Suppository into rectum every twelve (12) hours.     INHALATIONAL SPACING DEVICE    1 Each by Does Not Apply route as needed for Cough (with inhaler). METHOCARBAMOL (ROBAXIN-750) 750 MG TABLET    Take 1 Tab by mouth three (3) times daily. POLYETHYLENE GLYCOL (MIRALAX) 17 GRAM/DOSE POWDER    Take 17 g by mouth daily. 1 tablespoon with 8 oz of water daily    PSEUDOEPHEDRINE (SUDAFED) 30 MG TABLET    Take 1 Tab by mouth every six (6) hours as needed for Congestion.    These Medications have changed    No medications on file   Stop Taking    No medications on file

## 2018-01-04 NOTE — DISCHARGE INSTRUCTIONS

## 2018-01-06 LAB
C TRACH RRNA SPEC QL NAA+PROBE: NEGATIVE
N GONORRHOEA RRNA SPEC QL NAA+PROBE: NEGATIVE
SPECIMEN SOURCE: NORMAL

## 2018-07-15 ENCOUNTER — HOSPITAL ENCOUNTER (EMERGENCY)
Age: 28
Discharge: HOME | End: 2018-07-15
Attending: EMERGENCY MEDICINE
Payer: COMMERCIAL

## 2018-07-15 ENCOUNTER — APPOINTMENT (OUTPATIENT)
Dept: GENERAL RADIOLOGY | Age: 28
End: 2018-07-15
Attending: EMERGENCY MEDICINE
Payer: COMMERCIAL

## 2018-07-15 VITALS
HEIGHT: 71 IN | RESPIRATION RATE: 16 BRPM | WEIGHT: 220 LBS | HEART RATE: 51 BPM | OXYGEN SATURATION: 98 % | DIASTOLIC BLOOD PRESSURE: 98 MMHG | SYSTOLIC BLOOD PRESSURE: 151 MMHG | BODY MASS INDEX: 30.8 KG/M2 | TEMPERATURE: 99 F

## 2018-07-15 DIAGNOSIS — F17.200 NICOTINE USE DISORDER: ICD-10-CM

## 2018-07-15 DIAGNOSIS — K62.5 RECTAL BLEEDING: Primary | ICD-10-CM

## 2018-07-15 DIAGNOSIS — R03.0 ELEVATED BLOOD PRESSURE READING WITHOUT DIAGNOSIS OF HYPERTENSION: ICD-10-CM

## 2018-07-15 LAB
ABO + RH BLD: NORMAL
ALBUMIN SERPL-MCNC: 3.9 G/DL (ref 3.4–5)
ALBUMIN/GLOB SERPL: 1.1 {RATIO} (ref 0.8–1.7)
ALP SERPL-CCNC: 68 U/L (ref 45–117)
ALT SERPL-CCNC: 38 U/L (ref 16–61)
ANION GAP SERPL CALC-SCNC: 7 MMOL/L (ref 3–18)
AST SERPL-CCNC: 30 U/L (ref 15–37)
BASOPHILS # BLD: 0 K/UL (ref 0–0.1)
BASOPHILS NFR BLD: 0 % (ref 0–2)
BILIRUB SERPL-MCNC: 0.4 MG/DL (ref 0.2–1)
BLOOD GROUP ANTIBODIES SERPL: NORMAL
BUN SERPL-MCNC: 17 MG/DL (ref 7–18)
BUN/CREAT SERPL: 14 (ref 12–20)
CALCIUM SERPL-MCNC: 9 MG/DL (ref 8.5–10.1)
CHLORIDE SERPL-SCNC: 106 MMOL/L (ref 100–108)
CO2 SERPL-SCNC: 26 MMOL/L (ref 21–32)
CREAT SERPL-MCNC: 1.23 MG/DL (ref 0.6–1.3)
DIFFERENTIAL METHOD BLD: ABNORMAL
EOSINOPHIL # BLD: 0.3 K/UL (ref 0–0.4)
EOSINOPHIL NFR BLD: 3 % (ref 0–5)
ERYTHROCYTE [DISTWIDTH] IN BLOOD BY AUTOMATED COUNT: 12.4 % (ref 11.6–14.5)
GLOBULIN SER CALC-MCNC: 3.5 G/DL (ref 2–4)
GLUCOSE SERPL-MCNC: 101 MG/DL (ref 74–99)
HCT VFR BLD AUTO: 39.3 % (ref 36–48)
HGB BLD-MCNC: 13.6 G/DL (ref 13–16)
LYMPHOCYTES # BLD: 1.7 K/UL (ref 0.9–3.6)
LYMPHOCYTES NFR BLD: 16 % (ref 21–52)
MCH RBC QN AUTO: 29.1 PG (ref 24–34)
MCHC RBC AUTO-ENTMCNC: 34.6 G/DL (ref 31–37)
MCV RBC AUTO: 84.2 FL (ref 74–97)
MONOCYTES # BLD: 0.7 K/UL (ref 0.05–1.2)
MONOCYTES NFR BLD: 7 % (ref 3–10)
NEUTS SEG # BLD: 7.8 K/UL (ref 1.8–8)
NEUTS SEG NFR BLD: 74 % (ref 40–73)
PLATELET # BLD AUTO: 215 K/UL (ref 135–420)
PMV BLD AUTO: 10.3 FL (ref 9.2–11.8)
POTASSIUM SERPL-SCNC: 3.9 MMOL/L (ref 3.5–5.5)
PROT SERPL-MCNC: 7.4 G/DL (ref 6.4–8.2)
RBC # BLD AUTO: 4.67 M/UL (ref 4.7–5.5)
SODIUM SERPL-SCNC: 139 MMOL/L (ref 136–145)
SPECIMEN EXP DATE BLD: NORMAL
WBC # BLD AUTO: 10.6 K/UL (ref 4.6–13.2)

## 2018-07-15 PROCEDURE — 99282 EMERGENCY DEPT VISIT SF MDM: CPT

## 2018-07-15 PROCEDURE — 80053 COMPREHEN METABOLIC PANEL: CPT | Performed by: EMERGENCY MEDICINE

## 2018-07-15 PROCEDURE — 86900 BLOOD TYPING SEROLOGIC ABO: CPT | Performed by: EMERGENCY MEDICINE

## 2018-07-15 PROCEDURE — 85025 COMPLETE CBC W/AUTO DIFF WBC: CPT | Performed by: EMERGENCY MEDICINE

## 2018-07-15 PROCEDURE — 74018 RADEX ABDOMEN 1 VIEW: CPT

## 2018-07-15 RX ORDER — POLYETHYLENE GLYCOL 3350 17 G/17G
17 POWDER, FOR SOLUTION ORAL
Qty: 238 G | Refills: 0 | Status: SHIPPED | OUTPATIENT
Start: 2018-07-15 | End: 2020-02-26

## 2018-07-15 NOTE — DISCHARGE INSTRUCTIONS
Elevated Blood Pressure: Care Instructions  Your Care Instructions    Blood pressure is a measure of how hard the blood pushes against the walls of your arteries. It's normal for blood pressure to go up and down throughout the day. But if it stays up over time, you have high blood pressure. Two numbers tell you your blood pressure. The first number is the systolic pressure. It shows how hard the blood pushes when your heart is pumping. The second number is the diastolic pressure. It shows how hard the blood pushes between heartbeats, when your heart is relaxed and filling with blood. An ideal blood pressure in adults is less than 120/80 (say \"120 over 80\"). High blood pressure is 140/90 or higher. You have high blood pressure if your top number is 140 or higher or your bottom number is 90 or higher, or both. The main test for high blood pressure is simple, fast, and painless. To diagnose high blood pressure, your doctor will test your blood pressure at different times. After testing your blood pressure, your doctor may ask you to test it again when you are home. If you are diagnosed with high blood pressure, you can work with your doctor to make a long-term plan to manage it. Follow-up care is a key part of your treatment and safety. Be sure to make and go to all appointments, and call your doctor if you are having problems. It's also a good idea to know your test results and keep a list of the medicines you take. How can you care for yourself at home? · Do not smoke. Smoking increases your risk for heart attack and stroke. If you need help quitting, talk to your doctor about stop-smoking programs and medicines. These can increase your chances of quitting for good. · Stay at a healthy weight. · Try to limit how much sodium you eat to less than 2,300 milligrams (mg) a day. Your doctor may ask you to try to eat less than 1,500 mg a day. · Be physically active.  Get at least 30 minutes of exercise on most days of the week. Walking is a good choice. You also may want to do other activities, such as running, swimming, cycling, or playing tennis or team sports. · Avoid or limit alcohol. Talk to your doctor about whether you can drink any alcohol. · Eat plenty of fruits, vegetables, and low-fat dairy products. Eat less saturated and total fats. · Learn how to check your blood pressure at home. When should you call for help? Call your doctor now or seek immediate medical care if:  ? · Your blood pressure is much higher than normal (such as 180/110 or higher). ? · You think high blood pressure is causing symptoms such as:  ¨ Severe headache. ¨ Blurry vision. ? Watch closely for changes in your health, and be sure to contact your doctor if:  ? · You do not get better as expected. Where can you learn more? Go to http://sgSennarinaa.info/. Enter Z635 in the search box to learn more about \"Elevated Blood Pressure: Care Instructions. \"  Current as of: September 21, 2016  Content Version: 11.4  © 9649-4805 [employer]. Care instructions adapted under license by [employer] (which disclaims liability or warranty for this information). If you have questions about a medical condition or this instruction, always ask your healthcare professional. [address] any warranty or liability for your use of this information. Learning About Benefits From Quitting Smoking  How does quitting smoking make you healthier? If you're thinking about quitting smoking, you may have a few reasons to be smoke-free. Your health may be one of them. · When you quit smoking, you lower your risks for cancer, lung disease, heart attack, stroke, blood vessel disease, and blindness from macular degeneration. · When you're smoke-free, you get sick less often, and you heal faster. You are less likely to get colds, flu, bronchitis, and pneumonia.   · As a nonsmoker, you may find that your mood is better and you are less stressed. When and how will you feel healthier? Quitting has real health benefits that start from day 1 of being smoke-free. And the longer you stay smoke-free, the healthier you get and the better you feel. The first hours  · After just 20 minutes, your blood pressure and heart rate go down. That means there's less stress on your heart and blood vessels. · Within 12 hours, the level of carbon monoxide in your blood drops back to normal. That makes room for more oxygen. With more oxygen in your body, you may notice that you have more energy than when you smoked. After 2 weeks  · Your lungs start to work better. · Your risk of heart attack starts to drop. After 1 month  · When your lungs are clear, you cough less and breathe deeper, so it's easier to be active. · Your sense of taste and smell return. That means you can enjoy food more than you have since you started smoking. Over the years  · After 1 year, your risk of heart disease is half what it would be if you kept smoking. · After 5 years, your risk of stroke starts to shrink. Within a few years after that, it's about the same as if you'd never smoked. · After 10 years, your risk of dying from lung cancer is cut by about half. And your risk for many other types of cancer is lower too. How would quitting help others in your life? When you quit smoking, you improve the health of everyone who now breathes in your smoke. · Their heart, lung, and cancer risks drop, much like yours. · They are sick less. For babies and small children, living smoke-free means they're less likely to have ear infections, pneumonia, and bronchitis. · If you're a woman who is or will be pregnant someday, quitting smoking means a healthier . · Children who are close to you are less likely to become adult smokers. Where can you learn more? Go to http://sg-naa.info/.   Enter [phone] in the search box to learn more about \"Learning About Benefits From Quitting Smoking. \"  Current as of: November 29, 2017  Content Version: 11.7  © 7126-5762 [employer]. Care instructions adapted under license by [employer] (which disclaims liability or warranty for this information). If you have questions about a medical condition or this instruction, always ask your healthcare professional. [address] any warranty or liability for your use of this information. Rectal Bleeding: Care Instructions  Your Care Instructions    Rectal bleeding in small amounts is common. You may see red spotting on toilet paper or drops of blood in the toilet. Rectal bleeding has many possible causes, from something as minor as hemorrhoids to something as serious as colon cancer. You may need more tests to find the cause of your bleeding. Follow-up care is a key part of your treatment and safety. Be sure to make and go to all appointments, and call your doctor if you are having problems. It's also a good idea to know your test results and keep a list of the medicines you take. How can you care for yourself at home? · Avoid aspirin and other nonsteroidal anti-inflammatory drugs (NSAIDs), such as ibuprofen (Advil, Motrin) and naproxen (Aleve). They can cause you to bleed more. Ask your doctor if you can take acetaminophen (Tylenol). Read and follow all instructions on the label. · Use a stool softener that contains bran or psyllium. You can save money by buying bran or psyllium (available in bulk at most health food stores) and sprinkling it on foods or stirring it into fruit juice. You can also use a product such as Metamucil or Citrucel. · Take your medicines exactly as directed. Call your doctor if you think you are having a problem with your medicine. When should you call for help? Call 911 anytime you think you may need emergency care.  For example, call if:    · You passed out (lost consciousness).    Call your doctor now or seek immediate medical care if:    · You have new or worse pain.     · You have new or worse bleeding from the rectum.     · You are dizzy or light-headed, or you feel like you may faint.    Watch closely for changes in your health, and be sure to contact your doctor if:    · You cannot pass stools or gas.     · You do not get better as expected. Where can you learn more? Go to http://sg-naa.info/. Enter B764 in the search box to learn more about \"Rectal Bleeding: Care Instructions. \"  Current as of: May 12, 2017  Content Version: 11.7  © 1803-8456 [employer]. Care instructions adapted under license by [employer] (which disclaims liability or warranty for this information). If you have questions about a medical condition or this instruction, always ask your healthcare professional. [address] any warranty or liability for your use of this information.

## 2018-07-15 NOTE — ED TRIAGE NOTES
Patient states that since this morning around 0300 he has been having diarrhea that is just blood. Dark in color, he is also stating that it feels like someone is squeezing his intestines.

## 2018-07-15 NOTE — ED PROVIDER NOTES
EMERGENCY DEPARTMENT HISTORY AND PHYSICAL EXAM    6:02 PM      Date: 7/15/2018  Patient Name: Craig Mistry    History of Presenting Illness     Chief Complaint   Patient presents with    Anal Bleeding         History Provided By: Patient    Chief Complaint: Rectal Bleed  Duration: 15 Hours  Timing:  Intermittent  Location: Rectal  Quality:   Severity: Moderate  Modifying Factors: denies NSAID drug use  Associated Symptoms: constipation, diarrhea, abdominal pain, rectal pain      Additional History (Context): Craig Mistry is a 32 y.o. male with hx of PTSD who presents with c/o intermittent moderate rectal bleeding onset 15 hours. Pt reports drinking vodka last night prior to onset of sx with constipation last night and intermittent episodes of diarrhea with large amounts of blood present and abdominal and rectal pain. Denies use of NSAIDs. Denies being on blood thinners. Denies hx of hemorrhoids. Pt states having similar sx several years prior. PCP: None      Past History     Past Medical History:  Past Medical History:   Diagnosis Date    PTSD (post-traumatic stress disorder)        Past Surgical History:  Past Surgical History:   Procedure Laterality Date    HX ORTHOPAEDIC      nose reconstruction    HX OTHER SURGICAL      testicle surgery       Family History:  No family history on file. Social History:  Social History   Substance Use Topics    Smoking status: Current Every Day Smoker     Packs/day: 1.50     Years: 17.00    Smokeless tobacco: Current User    Alcohol use Yes       Allergies:  No Known Allergies      Review of Systems       Review of Systems   Constitutional: Negative for fever. HENT: Negative for sore throat. Eyes: Negative for redness and visual disturbance. Respiratory: Negative for shortness of breath and wheezing. Cardiovascular: Negative for chest pain. Gastrointestinal: Positive for abdominal pain, anal bleeding, blood in stool and constipation. Negative for diarrhea, nausea, rectal pain and vomiting. Endocrine: Negative for polyuria. Genitourinary: Negative for dysuria. Musculoskeletal: Negative for arthralgias and neck stiffness. Skin: Negative for rash. Neurological: Negative for dizziness, syncope, weakness and headaches. All other systems reviewed and are negative. Physical Exam     Visit Vitals    BP (!) 151/98 (BP 1 Location: Left arm, BP Patient Position: At rest;Sitting)    Pulse (!) 51    Temp 99 °F (37.2 °C)    Resp 16    Ht 5' 11\" (1.803 m)    Wt 99.8 kg (220 lb)    SpO2 98%    BMI 30.68 kg/m2         Physical Exam   Constitutional: He is oriented to person, place, and time. He appears well-developed and well-nourished. No distress. HENT:   Head: Normocephalic and atraumatic. Mouth/Throat: Oropharynx is clear and moist.   Eyes: Conjunctivae are normal. Pupils are equal, round, and reactive to light. No scleral icterus. Neck: Normal range of motion. Neck supple. Cardiovascular: Intact distal pulses. Capillary refill < 3 seconds   Pulmonary/Chest: Effort normal and breath sounds normal. No respiratory distress. He has no wheezes. Abdominal: Soft. Bowel sounds are normal. He exhibits no distension and no mass. There is no tenderness. There is no rebound and no guarding. Genitourinary: Rectal exam shows guaiac positive stool. Rectal exam shows no external hemorrhoid and anal tone normal.   Genitourinary Comments: No gross blood in rectum  Good rectal tone   Musculoskeletal: Normal range of motion. He exhibits no edema. Lymphadenopathy:     He has no cervical adenopathy. Neurological: He is alert and oriented to person, place, and time. No cranial nerve deficit. He exhibits normal muscle tone. Coordination normal.   Skin: Skin is warm and dry. He is not diaphoretic. Psychiatric: Thought content normal.   Nursing note and vitals reviewed.         Diagnostic Study Results     Labs -  Recent Results (from the past 12 hour(s))   CBC WITH AUTOMATED DIFF    Collection Time: 07/15/18  5:36 PM   Result Value Ref Range    WBC 10.6 4.6 - 13.2 K/uL    RBC 4.67 (L) 4.70 - 5.50 M/uL    HGB 13.6 13.0 - 16.0 g/dL    HCT 39.3 36.0 - 48.0 %    MCV 84.2 74.0 - 97.0 FL    MCH 29.1 24.0 - 34.0 PG    MCHC 34.6 31.0 - 37.0 g/dL    RDW 12.4 11.6 - 14.5 %    PLATELET 409 405 - 283 K/uL    MPV 10.3 9.2 - 11.8 FL    NEUTROPHILS 74 (H) 40 - 73 %    LYMPHOCYTES 16 (L) 21 - 52 %    MONOCYTES 7 3 - 10 %    EOSINOPHILS 3 0 - 5 %    BASOPHILS 0 0 - 2 %    ABS. NEUTROPHILS 7.8 1.8 - 8.0 K/UL    ABS. LYMPHOCYTES 1.7 0.9 - 3.6 K/UL    ABS. MONOCYTES 0.7 0.05 - 1.2 K/UL    ABS. EOSINOPHILS 0.3 0.0 - 0.4 K/UL    ABS. BASOPHILS 0.0 0.0 - 0.1 K/UL    DF AUTOMATED     METABOLIC PANEL, COMPREHENSIVE    Collection Time: 07/15/18  5:36 PM   Result Value Ref Range    Sodium 139 136 - 145 mmol/L    Potassium 3.9 3.5 - 5.5 mmol/L    Chloride 106 100 - 108 mmol/L    CO2 26 21 - 32 mmol/L    Anion gap 7 3.0 - 18 mmol/L    Glucose 101 (H) 74 - 99 mg/dL    BUN 17 7.0 - 18 MG/DL    Creatinine 1.23 0.6 - 1.3 MG/DL    BUN/Creatinine ratio 14 12 - 20      GFR est AA >60 >60 ml/min/1.73m2    GFR est non-AA >60 >60 ml/min/1.73m2    Calcium 9.0 8.5 - 10.1 MG/DL    Bilirubin, total 0.4 0.2 - 1.0 MG/DL    ALT (SGPT) 38 16 - 61 U/L    AST (SGOT) 30 15 - 37 U/L    Alk. phosphatase 68 45 - 117 U/L    Protein, total 7.4 6.4 - 8.2 g/dL    Albumin 3.9 3.4 - 5.0 g/dL    Globulin 3.5 2.0 - 4.0 g/dL    A-G Ratio 1.1 0.8 - 1.7     TYPE & SCREEN    Collection Time: 07/15/18  5:36 PM   Result Value Ref Range    Crossmatch Expiration 07/18/2018     ABO/Rh(D) O POSITIVE     Antibody screen NEG        Radiologic Studies -   XR ABD (KUB)    (Results Pending)   KUB: ED Provider Interpretation - Nonspecific bowel gas. No free air. No obstruction. 6:36 PM        Medical Decision Making   I am the first provider for this patient.     I reviewed the vital signs, available nursing notes, past medical history, past surgical history, family history and social history. Vital Signs-Reviewed the patient's vital signs. Pulse Oximetry Analysis -  98% on room air (Interpretation) Normal    Cardiac Monitor:  Rate: 51 bpm  Rhythm:  Sinus Bradycardia    Records Reviewed: Nursing Notes (Time of Review: 5:57 PM)    Provider Notes (Medical Decision Making):  MDM  Number of Diagnoses or Management Options  Elevated blood pressure reading without diagnosis of hypertension:   Nicotine use disorder:   Rectal bleeding:   Diagnosis management comments: DDX: Hemorrhoids, bleeding ulcer, constipation, other GI bleed causes. Will check labs, rectal exam. No abdominal tenderness on deep palpation. Pt admits to intermittent constipation but no diarrhea. Amount and/or Complexity of Data Reviewed  Clinical lab tests: ordered and reviewed  Tests in the radiology section of CPT®: ordered and reviewed    Patient Progress  Patient progress: stable      Medications - No data to display        ED Course: Progress Notes, Reevaluation, and Consults:  6:37 PM - KUB shows no free air and no obstruction per my preliminary read. Will discharge pt home with Miralax and have follow up with GI. Hemoglobin normal. Hx of constipation in past. With these sx's he admits to straining when on toilet. Encouraged pt to increase fiber, and to quit smoking. No hx HTN, has elevated BP, asymptomatic. Gave PCP and GI referral and to get BP recheck by pcp. Diagnosis     Clinical Impression:   1. Rectal bleeding    2. Elevated blood pressure reading without diagnosis of hypertension    3.  Nicotine use disorder        Disposition: Discharge    Follow-up Information     Follow up With Details Comments Contact Info    Whit Vásquez MD Call in 2 days  [address] [facility]      Familia Thornton MD Call in 1 week  [address] [facility] DEPT  As needed, If symptoms worsen [address] [facility] [address]  [phone]           Patient's Medications   Start Taking    POLYETHYLENE GLYCOL (MIRALAX) 17 GRAM/DOSE POWDER    Take 17 g by mouth daily as needed. 1 tablespoon with 8 oz of water daily  Indications: constipation   Continue Taking    ALBUTEROL (PROVENTIL HFA, VENTOLIN HFA, PROAIR HFA) 90 MCG/ACTUATION INHALER    Take 2 Puffs by inhalation every four (4) hours as needed for Wheezing. INHALATIONAL SPACING DEVICE    1 Each by Does Not Apply route as needed for Cough (with inhaler). These Medications have changed    No medications on file   Stop Taking    CHLORPHENIRAMINE-HYDROCODONE (TUSSIONEX PENNKINETIC ER) 10-8 MG/5 ML SUSPENSION    Take 5 mL by mouth every twelve (12) hours as needed for Cough. Max Daily Amount: 10 mL. HYDROCORTISONE (ANUSOL-HC) 25 MG SUPP    Insert 1 Suppository into rectum every twelve (12) hours. METHOCARBAMOL (ROBAXIN-750) 750 MG TABLET    Take 1 Tab by mouth three (3) times daily. POLYETHYLENE GLYCOL (MIRALAX) 17 GRAM/DOSE POWDER    Take 17 g by mouth daily. 1 tablespoon with 8 oz of water daily    PSEUDOEPHEDRINE (SUDAFED) 30 MG TABLET    Take 1 Tab by mouth every six (6) hours as needed for Congestion. _______________________________    Attestations:  Scribe Attestation     Jessee Brittle acting as a scribe for and in the presence of Luke Smart DO      July 15, 2018 at 6:10 PM       Provider Attestation:      I personally performed the services described in the documentation, reviewed the documentation, as recorded by the scribe in my presence, and it accurately and completely records my words and actions.  July 15, 2018 at 6:10 PM - Luke Smart DO    _______________________________

## 2018-08-27 ENCOUNTER — HOSPITAL ENCOUNTER (EMERGENCY)
Age: 28
Discharge: HOME | End: 2018-08-27
Attending: EMERGENCY MEDICINE
Payer: COMMERCIAL

## 2018-08-27 VITALS
TEMPERATURE: 98.6 F | RESPIRATION RATE: 16 BRPM | HEART RATE: 59 BPM | OXYGEN SATURATION: 100 % | DIASTOLIC BLOOD PRESSURE: 84 MMHG | SYSTOLIC BLOOD PRESSURE: 141 MMHG

## 2018-08-27 DIAGNOSIS — G43.809 OTHER MIGRAINE WITHOUT STATUS MIGRAINOSUS, NOT INTRACTABLE: Primary | ICD-10-CM

## 2018-08-27 PROCEDURE — 99282 EMERGENCY DEPT VISIT SF MDM: CPT

## 2018-08-27 NOTE — LETTER
NOTIFICATION RETURN TO WORK / SCHOOL 
 
8/27/2018 4:45 PM 
 
Mr. Tamie Barnes 
[address] [facility] [address] To Whom It May Concern: 
 
Tamie Barnes is currently under the care of [facility] EMERGENCY DEPT. He will return to work/school on: 8/30/18 If there are questions or concerns please have the patient contact our office. Sincerely, Dana Jama PA-C

## 2018-08-27 NOTE — ED PROVIDER NOTES
EMERGENCY DEPARTMENT HISTORY AND PHYSICAL EXAM    4:06 PM      Date: 8/27/2018  Patient Name: Riya Hagan    History of Presenting Illness     Chief Complaint   Patient presents with    Migraine         History Provided By: Patient    Chief Complaint: Mild HA  Duration:  Hours ago this morning  Timing:  Improving  Location: Generalized  Quality: Aching  Severity: Mild  Modifying Factors: Tylenol improved  Associated Symptoms: Mild lightheadedness, posterior neck pain       Additional History (Context): Riya Hagan is a 25 y.o. male with PTSD who presents with complaints of an improving headache with acute onset hours ago this morning while on the way to work. Patient reports Tylenol improved his headache greatly, but notes he needs a work note. Patient denies this is the worst headache of his life and reports this headache is similar to his past ones. He states that with onset, the headache lasted for about 4 hours. He also reports lightheadedness that has greatly improved. He denies fever or dizziness but notes posterior neck pain that usually accompanies his headache. Denies any further complaints or symptoms at the moment. PCP: None    Current Outpatient Prescriptions   Medication Sig Dispense Refill    polyethylene glycol (MIRALAX) 17 gram/dose powder Take 17 g by mouth daily as needed. 1 tablespoon with 8 oz of water daily  Indications: constipation 238 g 0    inhalational spacing device 1 Each by Does Not Apply route as needed for Cough (with inhaler). 1 Device 0    albuterol (PROVENTIL HFA, VENTOLIN HFA, PROAIR HFA) 90 mcg/actuation inhaler Take 2 Puffs by inhalation every four (4) hours as needed for Wheezing.  1 Inhaler 0       Past History     Past Medical History:  Past Medical History:   Diagnosis Date    PTSD (post-traumatic stress disorder)        Past Surgical History:  Past Surgical History:   Procedure Laterality Date    HX ORTHOPAEDIC      nose reconstruction    HX OTHER SURGICAL      testicle surgery       Family History:  No family history on file. Social History:  Social History   Substance Use Topics    Smoking status: Current Every Day Smoker     Packs/day: 1.50     Years: 17.00    Smokeless tobacco: Current User    Alcohol use Yes       Allergies:  No Known Allergies      Review of Systems     Review of Systems   Constitutional: Negative for chills and fever. Respiratory: Negative for shortness of breath. Cardiovascular: Negative for chest pain. Gastrointestinal: Negative for nausea and vomiting. Musculoskeletal: Positive for neck pain (posterior). Neurological: Positive for light-headedness (mild) and headaches (mild). Negative for dizziness. All other systems reviewed and are negative. Physical Exam     Visit Vitals    /84 (BP 1 Location: Left arm, BP Patient Position: Sitting)    Pulse (!) 59    Temp 98.6 °F (37 °C)    Resp 16    SpO2 100%       Physical Exam   Constitutional: He is oriented to person, place, and time. He appears well-developed and well-nourished. No distress. HENT:   Head: Normocephalic and atraumatic. Eyes: Conjunctivae and EOM are normal. Right eye exhibits no discharge. Left eye exhibits no discharge. No scleral icterus. Neck: Normal range of motion. Neck supple. No tracheal deviation present. Cardiovascular: Normal rate, regular rhythm and normal heart sounds. No murmur heard. Pulmonary/Chest: Effort normal and breath sounds normal. No respiratory distress. He has no wheezes. He has no rales. Abdominal: Soft. He exhibits no distension. There is no tenderness. There is no rebound and no guarding. Musculoskeletal: Normal range of motion. He exhibits no edema or deformity. Neurological: He is alert and oriented to person, place, and time. He has normal strength. He is not disoriented. No cranial nerve deficit or sensory deficit. Coordination and gait normal.   CN2-12 intact.   no nystagmus, neg pronator drift, neg romberg, neg LE drift. Normal gait. Normal finger to nose, heel to shin    Skin: Skin is warm and dry. He is not diaphoretic. Psychiatric: He has a normal mood and affect. His behavior is normal. Judgment and thought content normal.   Nursing note and vitals reviewed. Diagnostic Study Results     Labs -  No results found for this or any previous visit (from the past 12 hour(s)). Radiologic Studies -   No orders to display       Medical Decision Making   I am the first provider for this patient. I reviewed the vital signs, available nursing notes, past medical history, past surgical history, family history and social history. Vital Signs-Reviewed the patient's vital signs. Pulse Oximetry Analysis -  100% on room air (Normal)    Cardiac Monitor:  Rate: 59 bpm  Rhythm: Sinus Bradycardia    Records Reviewed: Nursing Notes (Time of Review: 4:06 PM)    ED Course: Progress Notes, Reevaluation, and Consults:    Provider Notes (Medical Decision Making):   Pt here with recurrent resolved headache. No red flags including including first or worst h/a ever, new onset, >49yo, change in pattern, worsening headache, acute or sudden onset, onset with exertion, HIV status, cancer status, systemic sx. VSS, exam unremarkable. Will d/c home with work return. Return sx discussed. Diagnosis     Clinical Impression:   1. Other migraine without status migrainosus, not intractable        Disposition: Discharge     Follow-up Information     Follow up With Details Comments Contact Info    None   None ([phone]) Patient stated that they have no PCP      [facility]   [address] [facility] [id number] [phone]           Patient's Medications   Start Taking    No medications on file   Continue Taking    ALBUTEROL (PROVENTIL HFA, VENTOLIN HFA, PROAIR HFA) 90 MCG/ACTUATION INHALER    Take 2 Puffs by inhalation every four (4) hours as needed for Wheezing.     INHALATIONAL SPACING DEVICE 1 Each by Does Not Apply route as needed for Cough (with inhaler). POLYETHYLENE GLYCOL (MIRALAX) 17 GRAM/DOSE POWDER    Take 17 g by mouth daily as needed. 1 tablespoon with 8 oz of water daily  Indications: constipation   These Medications have changed    No medications on file   Stop Taking    No medications on file     _______________________________    Attestations:  Bhanu [address] [place] [address] acting as a scribe for and in the presence of Ron Telles      August 27, 2018 at 4:06 PM       Provider Attestation:      I personally performed the services described in the documentation, reviewed the documentation, as recorded by the scribe in my presence, and it accurately and completely records my words and actions.  August 27, 2018 at 4:06 PM - GRIS Telles  _______________________________

## 2018-08-27 NOTE — DISCHARGE INSTRUCTIONS
Migraine Headache: Care Instructions  Your Care Instructions  Migraines are painful, throbbing headaches that often start on one side of the head. They may cause nausea and vomiting and make you sensitive to light, sound, or smell. Without treatment, migraines can last from 4 hours to a few days. Medicines can help prevent migraines or stop them after they have started. Your doctor can help you find which ones work best for you. Follow-up care is a key part of your treatment and safety. Be sure to make and go to all appointments, and call your doctor if you are having problems. It's also a good idea to know your test results and keep a list of the medicines you take. How can you care for yourself at home? · Do not drive if you have taken a prescription pain medicine. · Rest in a quiet, dark room until your headache is gone. Close your eyes, and try to relax or go to sleep. Don't watch TV or read. · Put a cold, moist cloth or cold pack on the painful area for 10 to 20 minutes at a time. Put a thin cloth between the cold pack and your skin. · Use a warm, moist towel or a heating pad set on low to relax tight shoulder and neck muscles. · Have someone gently massage your neck and shoulders. · Take your medicines exactly as prescribed. Call your doctor if you think you are having a problem with your medicine. You will get more details on the specific medicines your doctor prescribes. · Be careful not to take pain medicine more often than the instructions allow. You could get worse or more frequent headaches when the medicine wears off. To prevent migraines  · Keep a headache diary so you can figure out what triggers your headaches. Avoiding triggers may help you prevent headaches. Record when each headache began, how long it lasted, and what the pain was like.  (Was it throbbing, aching, stabbing, or dull?) Write down any other symptoms you had with the headache, such as nausea, flashing lights or dark spots, or sensitivity to bright light or loud noise. Note if the headache occurred near your period. List anything that might have triggered the headache. Triggers may include certain foods (chocolate, cheese, wine) or odors, smoke, bright light, stress, or lack of sleep. · If your doctor has prescribed medicine for your migraines, take it as directed. You may have medicine that you take only when you get a migraine and medicine that you take all the time to help prevent migraines. ¨ If your doctor has prescribed medicine for when you get a headache, take it at the first sign of a migraine, unless your doctor has given you other instructions. ¨ If your doctor has prescribed medicine to prevent migraines, take it exactly as prescribed. Call your doctor if you think you are having a problem with your medicine. · Find healthy ways to deal with stress. Migraines are most common during or right after stressful times. Take time to relax before and after you do something that has caused a migraine in the past.  · Try to keep your muscles relaxed by keeping good posture. Check your jaw, face, neck, and shoulder muscles for tension. Try to relax them. When you sit at a desk, change positions often. And make sure to stretch for 30 seconds each hour. · Get plenty of sleep and exercise. · Eat meals on a regular schedule. Avoid foods and drinks that often trigger migraines. These include chocolate, alcohol (especially red wine and port), aspartame, monosodium glutamate (MSG), and some additives found in foods (such as hot dogs, rowley, cold cuts, aged cheeses, and pickled foods). · Limit caffeine. Don't drink too much coffee, tea, or soda. But don't quit caffeine suddenly. That can also give you migraines. · Do not smoke or allow others to smoke around you. If you need help quitting, talk to your doctor about stop-smoking programs and medicines. These can increase your chances of quitting for good.   · If you are taking birth control pills or hormone therapy, talk to your doctor about whether they are triggering your migraines. When should you call for help? Call 911 anytime you think you may need emergency care. For example, call if:    · You have signs of a stroke. These may include:  ¨ Sudden numbness, paralysis, or weakness in your face, arm, or leg, especially on only one side of your body. ¨ Sudden vision changes. ¨ Sudden trouble speaking. ¨ Sudden confusion or trouble understanding simple statements. ¨ Sudden problems with walking or balance. ¨ A sudden, severe headache that is different from past headaches.    Call your doctor now or seek immediate medical care if:    · You have new or worse nausea and vomiting.     · You have a new or higher fever.     · Your headache gets much worse.    Watch closely for changes in your health, and be sure to contact your doctor if:    · You are not getting better after 2 days (48 hours). Where can you learn more? Go to http://sg-naa.info/. Enter V577 in the search box to learn more about \"Migraine Headache: Care Instructions. \"  Current as of: October 9, 2017  Content Version: 11.7  © 5765-1228 [employer]. Care instructions adapted under license by [employer] (which disclaims liability or warranty for this information). If you have questions about a medical condition or this instruction, always ask your healthcare professional. [address] any warranty or liability for your use of this information.

## 2020-02-25 PROCEDURE — 99282 EMERGENCY DEPT VISIT SF MDM: CPT

## 2020-02-26 ENCOUNTER — HOSPITAL ENCOUNTER (EMERGENCY)
Age: 30
Discharge: HOME | End: 2020-02-26
Attending: EMERGENCY MEDICINE
Payer: COMMERCIAL

## 2020-02-26 VITALS
WEIGHT: 220 LBS | HEART RATE: 68 BPM | OXYGEN SATURATION: 99 % | TEMPERATURE: 98.4 F | BODY MASS INDEX: 30.68 KG/M2 | DIASTOLIC BLOOD PRESSURE: 71 MMHG | RESPIRATION RATE: 16 BRPM | SYSTOLIC BLOOD PRESSURE: 119 MMHG

## 2020-02-26 DIAGNOSIS — H16.133 WELDERS' KERATITIS OF BOTH EYES: Primary | ICD-10-CM

## 2020-02-26 PROCEDURE — 74011000250 HC RX REV CODE- 250: Performed by: EMERGENCY MEDICINE

## 2020-02-26 RX ORDER — OXYCODONE HYDROCHLORIDE 5 MG/1
5 TABLET ORAL
Qty: 12 TAB | Refills: 0 | Status: SHIPPED | OUTPATIENT
Start: 2020-02-26 | End: 2020-02-29

## 2020-02-26 RX ORDER — PROPARACAINE HYDROCHLORIDE 5 MG/ML
2 SOLUTION/ DROPS OPHTHALMIC
Status: COMPLETED | OUTPATIENT
Start: 2020-02-26 | End: 2020-02-26

## 2020-02-26 RX ORDER — TETRACAINE HYDROCHLORIDE 5 MG/ML
2 SOLUTION OPHTHALMIC
Status: DISCONTINUED | OUTPATIENT
Start: 2020-02-26 | End: 2020-02-26

## 2020-02-26 RX ORDER — SODIUM CHLORIDE 5 %
OINTMENT (GRAM) OPHTHALMIC (EYE)
Qty: 1 TUBE | Refills: 0 | Status: SHIPPED | OUTPATIENT
Start: 2020-02-26

## 2020-02-26 RX ADMIN — FLUORESCEIN SODIUM 2 STRIP: 1 STRIP OPHTHALMIC at 01:02

## 2020-02-26 RX ADMIN — PROPARACAINE HYDROCHLORIDE 2 DROP: 5 SOLUTION/ DROPS OPHTHALMIC at 01:34

## 2020-02-26 NOTE — DISCHARGE INSTRUCTIONS
Patient Education        UV Keratitis: Care Instructions  Your Care Instructions    Keratitis is a swelling of the cornea. The cornea is the outer, clear layer that covers the colored part of your eye and pupil. Ultraviolet light can burn your eye and cause keratitis. Bright sunlight can do this if you don't wear sunglasses that block ultraviolet light. This is especially true when the sun's rays reflect off snow or water, or when you look directly into the sun for a long time. The problem can also be caused by bright light from welding equipment (also known as \"'s flash\"), tanning booths, and sunlamps. These can burn your eyes if you don't wear eye protection. Your doctor may have put a few drops of medicine into your eye to prevent infection and scarring. Your doctor may also have given you an eye patch or a special type of contact lens to wear while your eye heals. It may take 24 hours after the burn to know how much of your eye was affected. Your vision may be blurry and your eye may hurt and feel irritated. These symptoms should start to get better within a few days. Follow-up care is a key part of your treatment and safety. Be sure to make and go to all appointments, and call your doctor if you are having problems. It is also a good idea to know your test results and keep a list of the medicines you take. How can you care for yourself at home? · If your doctor gave you eyedrops, use them as directed. Antibiotic eyedrops help prevent infection and scarring. Use the medicine for as long as your doctor tells you to, even if your eye starts to look and feel better. Keep the bottle tip clean. Do not let it touch the eye area. · Be sure to only use the eyedrops your doctor prescribed. Do not use over-the-counter eyedrops. They may make your symptoms worse. · To put in eyedrops:  ? Tilt your head back, and pull your lower eyelid down with one finger. ?  Drop or squirt the medicine inside the lower lid.  ? Close your eye for 30 to 60 seconds to let the drops move around. ? Do not touch the eyedropper tip to your eyelashes or any other surface. · Put a cold washcloth over your eye to help reduce swelling. Do this for 15 minutes 3 or 4 times a day for the first 24 to 48 hours after a burn to your eye. If you use a small ice pack, place a cloth between the ice and your skin. Do not use chemical cooling packs on or near your eye. If the pack leaks, the chemicals could cause more harm to your eye. After the swelling goes down, put a warm washcloth over your eye to help relieve pain. · Do not wear contact lenses or eye makeup until your doctor says it is okay. If you were wearing disposable contacts when the burn happened, throw them away. Use a new pair when your eye has healed and it is safe to wear them again. · Take an over-the-counter pain medicine, such as acetaminophen (Tylenol), ibuprofen (Advil, Motrin), or naproxen (Aleve), as needed. Read and follow all instructions on the label. · If you feel like you have something in your eye, don't rub it. This could cause more harm to your eye. To prevent ultraviolet burns to your eye:  · Wear sunglasses that block ultraviolet rays. Do this even on cloudy days and in the winter. · Protect your eyes from the sun's glare when you are boating, sunbathing, or skiing. · Wear a mask or goggles designed for welding if you are welding or are near someone who is welding. · Use goggles to protect your eyes when you use tanning booths or sunlamps. When should you call for help? Call your doctor now or seek immediate medical care if:    · You have signs of an eye infection, such as:  ? Pus or thick discharge coming from the eye.  ? Redness or swelling around the eye.  ?  A fever.     · You have new or worse eye pain.     · You have vision changes.     · It feels like there is something in your eye.     · Light hurts your eye.    Watch closely for changes in your health, and be sure to contact your doctor if:    · You do not get better as expected. Where can you learn more? Go to http://sg-naa.info/. Enter W079 in the search box to learn more about \"UV Keratitis: Care Instructions. \"  Current as of: May 5, 2019  Content Version: 12.2  © 9332-7513 [employer]. Care instructions adapted under license by [employer] (which disclaims liability or warranty for this information). If you have questions about a medical condition or this instruction, always ask your healthcare professional. [address] any warranty or liability for your use of this information.

## 2020-02-26 NOTE — LETTER
NOTIFICATION RETURN TO WORK / SCHOOL 
 
2/26/2020 2:24 AM 
 
Mr. Bola Penn 
[address] [facility] [address] To Whom It May Concern: 
 
Bola Penn is currently under the care of [facility]. He will return to work/school on: 3/1, unless he feels otherwise well prior to 3/1. Bola Penn may return to work/school with the following restrictions: None If there are questions or concerns please have the patient contact our office. Sincerely, Ctaina Deleon MD

## 2020-02-26 NOTE — ED PROVIDER NOTES
EMERGENCY DEPARTMENT HISTORY AND PHYSICAL EXAM      Date: 2/26/2020  Patient Name: Mainor Eckert    History of Presenting Illness     Chief Complaint   Patient presents with    Eye Injury       History (Context): Mainor Eckert is a 30 y.o. gentleman with no prior medical history, who works as a , presents with subacute onset, severe, persistent bilateral eye pain this been worsening over a day. 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 He was wearing eye protection. Feels like prior welders keratitis. Pain exacerbated by light. No associated symptoms    On review of systems, the patient denies fever, chills, rashes, headache    PCP: None        Past History     Past Medical History:  Past Medical History:   Diagnosis Date    PTSD (post-traumatic stress disorder)        Past Surgical History:  Past Surgical History:   Procedure Laterality Date    HX ORTHOPAEDIC      nose reconstruction    HX OTHER SURGICAL      testicle surgery       Family History:  History reviewed. No pertinent family history. Social History:  Social History     Tobacco Use    Smoking status: Current Every Day Smoker     Packs/day: 1.50     Years: 17.00     Pack years: 25.50    Smokeless tobacco: Current User   Substance Use Topics    Alcohol use: Yes    Drug use: No       Allergies:  No Known Allergies    PMH, PSH, family history, social history, allergies reviewed with the patient with significant items noted above. Review of Systems   As per HPI, otherwise reviewed and negative. Physical Exam     Vitals:    02/26/20 0012   BP: 119/71   Pulse: 68   Resp: 16   Temp: 98.4 °F (36.9 °C)   SpO2: 99%   Weight: 99.8 kg (220 lb)       Gen: Well-appearing, in no acute distress   HEENT: Normocephalic, sclera erythematous without limbic sparing, cornea with clear uptake bilaterally in a stippling pattern  Cardiovascular: Normal rate, regular rhythm, no murmurs, rubs, gallops.   Pulses intact and equal distally. Pulmonary: No respiratory distress. No stridor. Clear lungs. ABD: Soft, nontender, nondistended. Neuro: Alert. Normal speech. Normal mentation. Psych: Normal thought content and thought processes. : No CVA tenderness  EXT: Moves all extremities well. No cyanosis or clubbing. Skin: Warm and well-perfused. Other:        Diagnostic Study Results     Labs -   No results found for this or any previous visit (from the past 12 hour(s)). Radiologic Studies -   No orders to display     CT Results  (Last 48 hours)    None        CXR Results  (Last 48 hours)    None            Medical Decision Making   I am the first provider for this patient. I reviewed the vital signs, available nursing notes, past medical history, past surgical history, family history and social history. Vital Signs-Reviewed the patient's vital signs. Records Reviewed: Personally, on initial evaluation    MDM:   Patient presents with bilateral eye pain after welding. Exam significant for bilateral corneal stippling. DDX considered: Welders keratitis, UV keratitis  DDX thought to be less likely but also considered due to high risk condition: Foreign body, endophthalmitis, autoimmune anterior uveitis    Patient condition on initial evaluation: Stable    Plan:   Pain Control     Orders as below:  Orders Placed This Encounter    DISCONTD: tetracaine HCl (PF) (PONTOCAINE) 0.5 % ophthalmic solution 2 Drop    fluorescein (FUL-MARISOL) 1 mg ophthalmic strip 2 Strip    proparacaine (OPTHAINE) 0.5 % ophthalmic solution 2 Drop    sodium chloride (RETAINE NACL) 5 % ophthalmic ointment    Artifi. Tear, Hypromellose,-PF 0.3 % drop    oxyCODONE IR (ROXICODONE) 5 mg immediate release tablet        ED Course:   Patient stable for ED course. Patient    Patient condition at time of disposition:Stable  DISCHARGE NOTE:   Pt has been reexamined. Patient has no new complaints, changes, or physical findings.   Care plan outlined and precautions discussed. Results were reviewed with the patient. All medications were reviewed with the patient; will d/c home with pain control. All of pt's questions and concerns were addressed. Alarm symptoms and return precautions associated with chief complaint and evaluation were reviewed with the patient in detail. The patient demonstrated adequate understanding. Patient was instructed and agrees to follow up with PCP, as well as to return to the ED upon further deterioration. Patient is ready to go home. The patient is happy with this plan    Follow-up Information     Follow up With Specialties Details Why [address] [facility]    [facility] Emergency Medicine  As needed, If symptoms worsen [phone]  [phone]          Discharge Medication List as of 2/26/2020  2:26 AM      START taking these medications    Details   sodium chloride (RETAINE NACL) 5 % ophthalmic ointment Apply a generous amount to both lower eyelids every night, Print, Disp-1 Tube, R-0      Artifi. Tear, Hypromellose,-PF 0.3 % drop Apply 2 Drops to eye every four (4) hours for 3 days. , Print, Disp-10 mL, R-0      oxyCODONE IR (ROXICODONE) 5 mg immediate release tablet Take 1 Tab by mouth every six (6) hours as needed for Pain for up to 3 days. Max Daily Amount: 20 mg., Print, Disp-12 Tab, R-0             Procedures:  Procedures      Critical Care Time:     Disposition: Stable    Diagnosis     Clinical Impression:   1. Welders' keratitis of both eyes        Signed,  Greer Villalba MD  Emergency Physician  [facility]    As a voice dictation software was utilized to dictate this note, minor word transpositions can occur. I apologize for confusing wording and typographic errors. Please feel free to contact me for clarification.   Appreciate you

## 2021-01-16 ENCOUNTER — APPOINTMENT (OUTPATIENT)
Dept: GENERAL RADIOLOGY | Age: 31
End: 2021-01-16
Attending: PHYSICIAN ASSISTANT
Payer: COMMERCIAL

## 2021-01-16 ENCOUNTER — HOSPITAL ENCOUNTER (EMERGENCY)
Age: 31
Discharge: HOME | End: 2021-01-16
Attending: EMERGENCY MEDICINE
Payer: COMMERCIAL

## 2021-01-16 VITALS
WEIGHT: 235 LBS | HEART RATE: 69 BPM | BODY MASS INDEX: 32.9 KG/M2 | HEIGHT: 71 IN | DIASTOLIC BLOOD PRESSURE: 80 MMHG | TEMPERATURE: 98.8 F | OXYGEN SATURATION: 97 % | SYSTOLIC BLOOD PRESSURE: 128 MMHG | RESPIRATION RATE: 16 BRPM

## 2021-01-16 DIAGNOSIS — S62.336A CLOSED DISPLACED FRACTURE OF NECK OF FIFTH METACARPAL BONE OF RIGHT HAND, INITIAL ENCOUNTER: Primary | ICD-10-CM

## 2021-01-16 PROCEDURE — 73130 X-RAY EXAM OF HAND: CPT

## 2021-01-16 PROCEDURE — 99282 EMERGENCY DEPT VISIT SF MDM: CPT

## 2021-01-16 RX ORDER — HYDROCODONE BITARTRATE AND ACETAMINOPHEN 5; 325 MG/1; MG/1
1 TABLET ORAL
Qty: 6 TAB | Refills: 0 | Status: SHIPPED | OUTPATIENT
Start: 2021-01-16 | End: 2021-01-19
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
## 2021-01-21 ENCOUNTER — OFFICE VISIT (OUTPATIENT)
Dept: ORTHOPEDIC SURGERY | Age: 31
End: 2021-01-21
Payer: COMMERCIAL

## 2021-01-21 VITALS
WEIGHT: 236 LBS | HEIGHT: 71 IN | OXYGEN SATURATION: 95 % | TEMPERATURE: 99.3 F | HEART RATE: 70 BPM | DIASTOLIC BLOOD PRESSURE: 74 MMHG | BODY MASS INDEX: 33.04 KG/M2 | RESPIRATION RATE: 16 BRPM | SYSTOLIC BLOOD PRESSURE: 120 MMHG

## 2021-01-21 DIAGNOSIS — S62.336A CLOSED DISPLACED FRACTURE OF NECK OF FIFTH METACARPAL BONE OF RIGHT HAND, INITIAL ENCOUNTER: Primary | ICD-10-CM

## 2021-01-21 PROCEDURE — 26600 TREAT METACARPAL FRACTURE: CPT | Performed by: ORTHOPAEDIC SURGERY

## 2021-01-21 PROCEDURE — 99203 OFFICE O/P NEW LOW 30 MIN: CPT | Performed by: ORTHOPAEDIC SURGERY
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
## 2021-02-04 ENCOUNTER — OFFICE VISIT (OUTPATIENT)
Dept: ORTHOPEDIC SURGERY | Age: 31
End: 2021-02-04
Payer: COMMERCIAL

## 2021-02-04 VITALS
WEIGHT: 243 LBS | HEART RATE: 59 BPM | TEMPERATURE: 98.3 F | SYSTOLIC BLOOD PRESSURE: 125 MMHG | BODY MASS INDEX: 33.89 KG/M2 | DIASTOLIC BLOOD PRESSURE: 69 MMHG

## 2021-02-04 DIAGNOSIS — S62.336D CLOSED DISPLACED FRACTURE OF NECK OF FIFTH METACARPAL BONE OF RIGHT HAND WITH ROUTINE HEALING, SUBSEQUENT ENCOUNTER: Primary | ICD-10-CM

## 2021-02-04 PROCEDURE — 99024 POSTOP FOLLOW-UP VISIT: CPT | Performed by: ORTHOPAEDIC SURGERY

## 2021-02-04 PROCEDURE — 73130 X-RAY EXAM OF HAND: CPT | Performed by: ORTHOPAEDIC SURGERY

## 2021-02-04 NOTE — PROGRESS NOTES
Gatito Cabrera is a 26 y.o. male right handed unspecified employment. Worker's Compensation and legal considerations: none filed. Vitals:    02/04/21 1015   BP: 125/69   Pulse: (!) 59   Temp: 98.3 °F (36.8 °C)   Weight: 243 lb (110.2 kg)   PainSc:   0 - No pain           Chief Complaint   Patient presents with    Hand Pain     right       HPI: Patient returns today for follow-up of his right fifth metacarpal fracture. He has been in a TKO brace but work on gentle range of motion. He reports pain and swelling to be improved but still having some difficulty with fully extending his finger. Initial HPI: Patient presents today for ER follow-up after sustaining a right fifth metacarpal fracture. Date of onset: 1/15/2021    Injury: Yes: Comment: punched a wall    Prior Treatment:  Yes: Comment: TKO brace    Numbness/ Tingling: No      ROS: Review of Systems - General ROS: negative  Psychological ROS: negative  ENT ROS: negative  Allergy and Immunology ROS: negative  Hematological and Lymphatic ROS: negative  Respiratory ROS: no cough, shortness of breath, or wheezing  Cardiovascular ROS: no chest pain or dyspnea on exertion  Gastrointestinal ROS: no abdominal pain, change in bowel habits, or black or bloody stools  Musculoskeletal ROS: positive for - pain in hand - right and swelling in hand - right  Neurological ROS: negative  Dermatological ROS: negative    Past Medical History:   Diagnosis Date    PTSD (post-traumatic stress disorder)        Past Surgical History:   Procedure Laterality Date    HX ORTHOPAEDIC      nose reconstruction    HX OTHER SURGICAL      testicle surgery       Current Outpatient Medications   Medication Sig Dispense Refill    sodium chloride (RETAINE NACL) 5 % ophthalmic ointment Apply a generous amount to both lower eyelids every night 1 Tube 0       No Known Allergies        PE:     Physical Exam  Vitals signs and nursing note reviewed.    Constitutional:       General: He is not in acute distress. Appearance: Normal appearance. He is not ill-appearing. Eyes:      Extraocular Movements: Extraocular movements intact. Pupils: Pupils are equal, round, and reactive to light. Neck:      Musculoskeletal: Normal range of motion and neck supple. Cardiovascular:      Pulses: Normal pulses. Pulmonary:      Effort: Pulmonary effort is normal. No respiratory distress. Abdominal:      General: Abdomen is flat. There is no distension. Musculoskeletal: Normal range of motion. General: Swelling and tenderness present. No deformity or signs of injury. Right lower leg: No edema. Left lower leg: No edema. Skin:     General: Skin is warm and dry. Capillary Refill: Capillary refill takes less than 2 seconds. Findings: No bruising or erythema. Neurological:      General: No focal deficit present. Mental Status: He is alert and oriented to person, place, and time. Cranial Nerves: No cranial nerve deficit. Sensory: No sensory deficit. Psychiatric:         Mood and Affect: Mood normal.         Behavior: Behavior normal.            Right hand: There is some mild continued edema about the dorsal hand. Sensation is intact distally and cap refill is brisk. Range of motion is near full in flexion and approximately 5 to 10 degree extensor lag of the right hand. Imagin/4/2021 films of right hand does not show any interval displacement of the fifth metacarpal neck fracture nor interval callus formation. There is approximately 30 to 45 degrees of volar angulation. External plain films of the right hand is significant for 5th metacarpal neck fracture with approximately 60 to 70 degrees of volar angulation.         ICD-10-CM ICD-9-CM    1. Closed displaced fracture of neck of fifth metacarpal bone of right hand with routine healing, subsequent encounter  S62.336D V54.19 AMB POC XRAY, HAND; 3+ VIEWS         Plan:     Continue TKO brace and gradually increase range of motion exercises. We will transition to kulwant straps at his next visit. Follow-up and Dispositions    · Return in about 4 weeks (around 3/4/2021) for Reevaluation and x-rays.           Plan was reviewed with patient, who verbalized agreement and understanding of the plan

## 2021-09-25 ENCOUNTER — APPOINTMENT (OUTPATIENT)
Dept: GENERAL RADIOLOGY | Age: 31
End: 2021-09-25
Attending: PHYSICIAN ASSISTANT
Payer: OTHER GOVERNMENT

## 2021-09-25 ENCOUNTER — HOSPITAL ENCOUNTER (EMERGENCY)
Age: 31
Discharge: HOME | End: 2021-09-25
Attending: STUDENT IN AN ORGANIZED HEALTH CARE EDUCATION/TRAINING PROGRAM
Payer: OTHER GOVERNMENT

## 2021-09-25 VITALS
BODY MASS INDEX: 32.2 KG/M2 | HEART RATE: 81 BPM | RESPIRATION RATE: 18 BRPM | SYSTOLIC BLOOD PRESSURE: 145 MMHG | DIASTOLIC BLOOD PRESSURE: 89 MMHG | HEIGHT: 71 IN | TEMPERATURE: 101.8 F | WEIGHT: 230 LBS | OXYGEN SATURATION: 99 %

## 2021-09-25 DIAGNOSIS — R50.9 FEVER, UNSPECIFIED FEVER CAUSE: Primary | ICD-10-CM

## 2021-09-25 DIAGNOSIS — U07.1 COVID-19: ICD-10-CM

## 2021-09-25 LAB

## 2021-09-25 PROCEDURE — 71045 X-RAY EXAM CHEST 1 VIEW: CPT

## 2021-09-25 PROCEDURE — 99282 EMERGENCY DEPT VISIT SF MDM: CPT

## 2021-09-25 PROCEDURE — 74011250637 HC RX REV CODE- 250/637: Performed by: PHYSICIAN ASSISTANT

## 2021-09-25 PROCEDURE — 0202U NFCT DS 22 TRGT SARS-COV-2: CPT

## 2021-09-25 RX ORDER — ACETAMINOPHEN 500 MG
1000 TABLET ORAL
Status: COMPLETED | OUTPATIENT
Start: 2021-09-25 | End: 2021-09-25

## 2021-09-25 RX ORDER — CODEINE PHOSPHATE AND GUAIFENESIN 10; 100 MG/5ML; MG/5ML
10 SOLUTION ORAL
Qty: 118 ML | Refills: 0 | Status: SHIPPED | OUTPATIENT
Start: 2021-09-25 | End: 2021-10-03

## 2021-09-25 RX ORDER — IBUPROFEN 800 MG/1
800 TABLET ORAL
Qty: 20 TABLET | Refills: 0 | Status: SHIPPED | OUTPATIENT
Start: 2021-09-25 | End: 2021-10-02

## 2021-09-25 RX ADMIN — ACETAMINOPHEN 1000 MG: 500 TABLET ORAL at 17:07

## 2021-09-25 NOTE — ED TRIAGE NOTES
Pt states he started having chills, body aches, fever, dizziness and headache. Pt states his girlfriend and step daughter are both home sick with a cough and was recently tested for covid with no results yet.

## 2021-09-25 NOTE — ED PROVIDER NOTES
EMERGENCY DEPARTMENT HISTORY AND PHYSICAL EXAM    Date: 9/25/2021  Patient Name: Franco Allen    History of Presenting Illness     Chief Complaint   Patient presents with    Chills    Headache    Dizziness         History Provided By patient     Chief Complaint: fever, chills, runny nose  Duration: 2 days  Timing:  Acute   Location: upper resp  Quality: achey   Severity: moderate  Modifying Factors: took motrin this am with no relief   Associated Symptoms: Dry cough, runny nose, fever, chills, body aches, dizziness, headaches      Additional History (Context): Franco Allen is a 33 y.o. male with past medical history PTSD who presents with complaints of 2 days of fever, chills, generalized body aches, dizziness, headaches, dry cough, and runny nose. Patient states he took Motrin around 4 AM this morning with little relief in his symptoms. Patient states his girlfriend's daughter has had similar symptoms but was only sick for about a day. He is not vaccinated against Covid. No other known sick exposures are reported. Denies abdominal complaints, chest pain, shortness of breath. No other complaints reported at this time. PCP: None    Current Outpatient Medications   Medication Sig Dispense Refill    ibuprofen (MOTRIN) 800 mg tablet Take 1 Tablet by mouth every six (6) hours as needed for Pain for up to 7 days. 20 Tablet 0    guaiFENesin-codeine (Cheratussin AC) 100-10 mg/5 mL solution Take 10 mL by mouth three (3) times daily as needed for Cough for up to 8 days. Max Daily Amount: 30 mL.  118 mL 0    sodium chloride (RETAINE NACL) 5 % ophthalmic ointment Apply a generous amount to both lower eyelids every night 1 Tube 0       Past History     Past Medical History:  Past Medical History:   Diagnosis Date    PTSD (post-traumatic stress disorder)        Past Surgical History:  Past Surgical History:   Procedure Laterality Date    HX ORTHOPAEDIC      nose reconstruction    HX OTHER SURGICAL      testicle surgery       Family History:  No family history on file. Social History:  Social History     Tobacco Use    Smoking status: Current Every Day Smoker     Packs/day: 1.50     Years: 17.00     Pack years: 25.50    Smokeless tobacco: Current User   Substance Use Topics    Alcohol use: Yes    Drug use: No       Allergies:  No Known Allergies      Review of Systems   Review of Systems   Constitutional: Positive for chills and fever. HENT: Positive for rhinorrhea. Negative for congestion and ear pain. Eyes: Negative. Negative for pain and redness. Respiratory: Positive for cough. Negative for shortness of breath, wheezing and stridor. Cardiovascular: Negative. Negative for chest pain and leg swelling. Gastrointestinal: Negative. Negative for abdominal pain, constipation, diarrhea, nausea and vomiting. Genitourinary: Negative. Negative for dysuria and frequency. Musculoskeletal: Positive for arthralgias and myalgias. Negative for back pain and neck pain. Skin: Negative. Negative for rash and wound. Neurological: Positive for dizziness and headaches. Negative for seizures and syncope. All other systems reviewed and are negative. All Other Systems Negative  Physical Exam     Vitals:    09/25/21 1600   BP: (!) 145/89   Pulse: 81   Resp: 18   Temp: (!) 101.8 °F (38.8 °C)   SpO2: 99%   Weight: 104.3 kg (230 lb)   Height: 5' 11\" (1.803 m)     Physical Exam  Vitals and nursing note reviewed. Constitutional:       General: He is in acute distress. Appearance: He is well-developed. He is not ill-appearing or diaphoretic. Comments: Mildly distressed   HENT:      Head: Normocephalic and atraumatic. Nose: Rhinorrhea present. Comments: Bilateral clear rhinorrhea noted  Eyes:      General: No scleral icterus. Right eye: No discharge. Left eye: No discharge.       Conjunctiva/sclera: Conjunctivae normal.   Cardiovascular:      Rate and Rhythm: Normal rate and regular rhythm. Heart sounds: Normal heart sounds. No murmur heard. No friction rub. No gallop. Pulmonary:      Effort: Pulmonary effort is normal. No respiratory distress. Breath sounds: Normal breath sounds. No stridor. No wheezing, rhonchi or rales. Musculoskeletal:         General: Normal range of motion. Cervical back: Normal range of motion and neck supple. Skin:     General: Skin is warm and dry. Findings: No erythema or rash. Neurological:      Mental Status: He is alert and oriented to person, place, and time. Coordination: Coordination normal.      Comments: Gait is steady and patient exhibits no evidence of ataxia. Patient is able to ambulate without difficulty. No focal neurological deficit noted. No facial droop, slurred speech, or evidence of altered mentation noted on exam.     Psychiatric:         Behavior: Behavior normal.         Thought Content:  Thought content normal.                 Diagnostic Study Results     Labs -     Recent Results (from the past 12 hour(s))   RESPIRATORY VIRUS PANEL W/COVID-19, PCR    Collection Time: 09/25/21  3:31 PM    Specimen: Nasopharyngeal   Result Value Ref Range    Adenovirus Not detected NOTD      Coronavirus 229E Not detected NOTD      Coronavirus HKU1 Not detected NOTD      Coronavirus CVNL63 Not detected NOTD      Coronavirus OC43 Not detected NOTD      SARS-CoV-2, PCR Detected (AA) NOTD      Metapneumovirus Not detected NOTD      Rhinovirus and Enterovirus Not detected NOTD      Influenza A Not detected NOTD      Influenza A, subtype H1 Not detected NOTD      Influenza A, subtype H3 Not detected NOTD      INFLUENZA A H1N1 PCR Not detected NOTD      Influenza B Not detected NOTD      Parainfluenza 1 Not detected NOTD      Parainfluenza 2 Not detected NOTD      Parainfluenza 3 Not detected NOTD      Parainfluenza virus 4 Not detected NOTD      RSV by PCR Not detected NOTD      B. parapertussis, PCR Not detected NOTD      Bordetella pertussis - PCR Not detected NOTD      Chlamydophila pneumoniae DNA, QL, PCR Not detected NOTD      Mycoplasma pneumoniae DNA, QL, PCR Not detected NOTD         Radiologic Studies -   XR CHEST PORT   Final Result   No radiographic evidence of an acute cardiopulmonary process. CT Results  (Last 48 hours)    None        CXR Results  (Last 48 hours)               09/25/21 1727  XR CHEST PORT Final result    Impression:  No radiographic evidence of an acute cardiopulmonary process. Narrative:  ONE VIEW CHEST RADIOGRAPH        INDICATION: cough fever. Chills, body aches, fever, dizziness, and headache. Concern for covid 19. COMPARISON: Acute abdominal series 1/31/2012. TECHNIQUE: AP view of the chest       FINDINGS:       LINES/TUBES: None. MEDIASTINUM: Cardiac silhouette is within normal limits. LUNGS: Lungs are clear. BONES/OTHER: No acute osseous abnormality. Medical Decision Making   I am the first provider for this patient. I reviewed the vital signs, available nursing notes, past medical history, past surgical history, family history and social history. Vital Signs-Reviewed the patient's vital signs. Records Reviewed: Agueda Stevens PA-C     Procedures:  Procedures    Provider Notes (Medical Decision Making): Impression:  Fever, chills, viral syndrome    Tylenol given in the ED     resp panel positive for COVID  Chest x-ray negative for definite acute cardiopulmonary process. Will plan to d/c with symptomatic tx, self quarantine, with pcp follow-up. Return precautions given. Pt agrees with this plan. Agueda Stevens PA-C     MED RECONCILIATION:  No current facility-administered medications for this encounter. Current Outpatient Medications   Medication Sig    ibuprofen (MOTRIN) 800 mg tablet Take 1 Tablet by mouth every six (6) hours as needed for Pain for up to 7 days.     guaiFENesin-codeine (Cheratussin AC) 100-10 mg/5 mL solution Take 10 mL by mouth three (3) times daily as needed for Cough for up to 8 days. Max Daily Amount: 30 mL.  sodium chloride (RETAINE NACL) 5 % ophthalmic ointment Apply a generous amount to both lower eyelids every night       Disposition:  D/c    DISCHARGE NOTE:   Patient is stable for discharge at this time. I have discussed all the findings from today's work up with the patient, including lab results and imaging. I have answered all questions. Rx for motrin and cheratussin given. Rest and close follow-up with the PCP recommended this week. Return to the ED immediately for any new or worsening symptoms. Agueda Stevens PA-C     Follow-up Information    None         Current Discharge Medication List      START taking these medications    Details   ibuprofen (MOTRIN) 800 mg tablet Take 1 Tablet by mouth every six (6) hours as needed for Pain for up to 7 days. Qty: 20 Tablet, Refills: 0  Start date: 9/25/2021, End date: 10/2/2021      guaiFENesin-codeine (Cheratussin AC) 100-10 mg/5 mL solution Take 10 mL by mouth three (3) times daily as needed for Cough for up to 8 days. Max Daily Amount: 30 mL. Qty: 118 mL, Refills: 0  Start date: 9/25/2021, End date: 10/3/2021    Associated Diagnoses: COVID-19               Diagnosis     Clinical Impression:   1. Fever, unspecified fever cause    2.  COVID-19

## 2021-09-25 NOTE — Clinical Note
[facility] EMERGENCY DEPT  [id number] [address] [phone]    Work/School Note    Date: 9/25/2021     To Whom It May concern:    Klever Padgett was evaulated by the following provider(s):  Attending Provider: Mynor Patrick MD  Physician Assistant: Christin Otilia virus is suspected. Per the CDC guidelines we recommend home isolation until the following conditions are all met:    1. At least 10 days have passed since symptoms first appeared and  2. At least 24 hours have passed since last fever without the use of fever-reducing medications and  3.  Symptoms (e.g., cough, shortness of breath) have improved    Sincerely,          Agueda Stevens PA-C

## 2021-09-25 NOTE — DISCHARGE INSTRUCTIONS
Qianmi Activation    Thank you for requesting access to Qianmi. Please follow the instructions below to securely access and download your online medical record. Qianmi allows you to send messages to your doctor, view your test results, renew your prescriptions, schedule appointments, and more. How Do I Sign Up? In your internet browser, go to [email or web address]  Click on the First Time User? Click Here link in the Sign In box. You will be redirect to the New Member Sign Up page. Enter your Qianmi Access Code exactly as it appears below. You will not need to use this code after youve completed the sign-up process. If you do not sign up before the expiration date, you must request a new code. Qianmi Access Code: [unfilled] (This is the date your Qianmi access code will )    Enter the last four digits of your Social Security Number (xxxx) and Date of Birth (mm/dd/yyyy) as indicated and click Submit. You will be taken to the next sign-up page. Create a Qianmi ID. This will be your Qianmi login ID and cannot be changed, so think of one that is secure and easy to remember. Create a Qianmi password. You can change your password at any time. Enter your Password Reset Question and Answer. This can be used at a later time if you forget your password. Enter your e-mail address. You will receive e-mail notification when new information is available in [address] Click Sign Up. You can now view and download portions of your medical record. Click the [place] link to download a portable copy of your medical information. Additional Information    If you have questions, please visit the Frequently Asked Questions section of the Qianmi website at [email or web address] Remember, Qianmi is NOT to be used for urgent needs. For medical emergencies, dial 911.

## 2021-09-27 ENCOUNTER — PATIENT OUTREACH (OUTPATIENT)
Dept: CASE MANAGEMENT | Age: 31
End: 2021-09-27

## 2021-09-27 NOTE — PROGRESS NOTES
Patient contacted regarding COVID-19 diagnosis. Discussed COVID-19 related testing which was available at this time. Test results were positive. Patient informed of results, if available? yes. Ambulatory Care Manager contacted the patient by telephone to perform post discharge assessment. Call within 2 business days of discharge: Yes Verified name and  with patient as identifiers. Provided introduction to self, and explanation of the CTN/ACM role, and reason for call due to risk factors for infection and/or exposure to COVID-19. Symptoms reviewed with patient who verbalized the following symptoms: fever, fatigue, pain or aching joints, cough, chills or shaking, sweating and dizziness/lightheadedness      Due to no new or worsening symptoms encounter was not routed to provider for escalation. Discussed follow-up appointments. If no appointment was previously scheduled, appointment scheduling offered:  Yes. Patient declined stating he is awaiting his insurance to start. Brent Skelton [address] follow up appointment(s): No future appointments. Non-[facility] follow up appointment(s): n/a    Interventions to address risk factors: Obtained and reviewed discharge summary and/or continuity of care documents and Assessment and support for treatment adherence and medication management-. Advance Care Planning:   Does patient have an Advance Directive: not on file. Educated patient about risk for severe COVID-19 due to risk factors according to CDC guidelines. ACM reviewed discharge instructions, medical action plan and red flag symptoms with the patient who verbalized understanding. Discussed COVID vaccination status: yes. Education provided on COVID-19 vaccination as appropriate. Discussed exposure protocols and quarantine with CDC Guidelines. Patient was given an opportunity to verbalize any questions and concerns and agrees to contact ACM or health care provider for questions related to their healthcare.     Reviewed and educated patient on any new and changed medications related to discharge diagnosis     Was patient discharged with a pulse oximeter? no Discussed and confirmed pulse oximeter discharge instructions and when to notify provider or seek emergency care. ACM provided contact information. Plan for follow-up call in 3-5 days based on severity of symptoms and risk factors. Patient states his symptoms tend to get worse at night. He had a fever of 101 last night. Still having body aches, fatigue, headache. Patient is taking all meds as prescribed. Patient was reminded to continue to quarantine and to return to ED if sx worsen. He verbalized understanding.

## 2021-10-11 ENCOUNTER — PATIENT OUTREACH (OUTPATIENT)
Dept: CASE MANAGEMENT | Age: 31
End: 2021-10-11

## 2021-10-11 NOTE — PROGRESS NOTES
Patient resolved from [address] Transitions episode on 10/11/21. Discussed COVID-19 related testing which was available at this time. Test results were positive. Patient informed of results, if available? yes     Patient/family has been provided the following resources and education related to COVID-19:                         Signs, symptoms and red flags related to COVID-19            [facility] exposure and quarantine guidelines            [facility] exposure contact - [phone]            Contact for their local Department of Health                 Patient currently reports that the following symptoms have improved:  no new symptoms and no worsening symptoms. No further outreach scheduled with this CTN/ACM/LPN/HC/ MA. Episode of Care resolved. Patient has this CTN/ACM/LPN/HC/MA contact information if future needs arise.

## 2023-10-09 ENCOUNTER — HOSPITAL ENCOUNTER (EMERGENCY)
Facility: HOSPITAL | Age: 33
Discharge: HOME | End: 2023-10-09
Attending: EMERGENCY MEDICINE

## 2023-10-09 VITALS
TEMPERATURE: 98.2 F | WEIGHT: 230 LBS | OXYGEN SATURATION: 100 % | DIASTOLIC BLOOD PRESSURE: 92 MMHG | RESPIRATION RATE: 20 BRPM | HEIGHT: 71 IN | BODY MASS INDEX: 32.2 KG/M2 | SYSTOLIC BLOOD PRESSURE: 135 MMHG | HEART RATE: 62 BPM

## 2023-10-09 DIAGNOSIS — S05.02XA ABRASION OF LEFT CORNEA, INITIAL ENCOUNTER: Primary | ICD-10-CM

## 2023-10-09 PROCEDURE — 6370000000 HC RX 637 (ALT 250 FOR IP): Performed by: HEALTH CARE PROVIDER

## 2023-10-09 PROCEDURE — 2500000003 HC RX 250 WO HCPCS: Performed by: HEALTH CARE PROVIDER

## 2023-10-09 PROCEDURE — 99283 EMERGENCY DEPT VISIT LOW MDM: CPT

## 2023-10-09 RX ORDER — PROPARACAINE HYDROCHLORIDE 5 MG/ML
1 SOLUTION/ DROPS OPHTHALMIC
Status: COMPLETED | OUTPATIENT
Start: 2023-10-09 | End: 2023-10-09

## 2023-10-09 RX ORDER — ERYTHROMYCIN 5 MG/G
OINTMENT OPHTHALMIC
Qty: 1 EACH | Refills: 0 | Status: SHIPPED | OUTPATIENT
Start: 2023-10-09

## 2023-10-09 RX ADMIN — PROPARACAINE HYDROCHLORIDE 1 DROP: 5 SOLUTION/ DROPS OPHTHALMIC at 17:46

## 2023-10-09 RX ADMIN — FLUORESCEIN SODIUM 1 MG: 1 STRIP OPHTHALMIC at 17:47

## 2023-10-09 ASSESSMENT — PAIN - FUNCTIONAL ASSESSMENT: PAIN_FUNCTIONAL_ASSESSMENT: 0-10

## 2023-10-09 ASSESSMENT — PAIN SCALES - GENERAL: PAINLEVEL_OUTOF10: 5

## 2023-10-09 ASSESSMENT — LIFESTYLE VARIABLES
HOW OFTEN DO YOU HAVE A DRINK CONTAINING ALCOHOL: MONTHLY OR LESS
HOW MANY STANDARD DRINKS CONTAINING ALCOHOL DO YOU HAVE ON A TYPICAL DAY: 1 OR 2

## 2023-10-09 NOTE — ED NOTES
Condition stable. Patient education was completed. Patient education was taught to patient using discussion and handout. Patient verbalized understanding. Patient was discharged to home with family. Patient was discharged ambulatory.         Elijah Coelho RN  10/09/23 [id number]

## 2023-10-10 ASSESSMENT — ENCOUNTER SYMPTOMS
EYE REDNESS: 1
EYE PAIN: 0
PHOTOPHOBIA: 0
EYE DISCHARGE: 0
DIARRHEA: 0
VOMITING: 0
SHORTNESS OF BREATH: 0
EYE ITCHING: 0

## 2023-10-10 NOTE — ED PROVIDER NOTES
EMERGENCY DEPARTMENT HISTORY AND PHYSICAL EXAM        Date: 10/9/2023  Patient Name: Darrel Stockton    History of Presenting Illness     Chief Complaint   Patient presents with    Eye Problem       History Provided By: History obtained from patient    HPI: Darrel Stockton, 26 y.o. male presents to the ED with cc of left eye irritation for 6 days    Patient is a  by work at [employer]. States he was grinding a plate 6 days ago and experienced irritation of his eye the following evening. He is not sure if there was flash burn from welding so he waited a few days to see if irritation subsided. Irritation has been persistent. There is no pain in the eye or decrease in visual acuity. He does not wear glasses or contacts    No nausea, vomiting, diarrhea, fever, chills, chest pain, shortness of breath, leg swelling    There are no other complaints, changes, or physical findings at this time. Records Reviewed: None    PCP: No primary care provider on file. No current facility-administered medications on file prior to encounter. Current Outpatient Medications on File Prior to Encounter   Medication Sig Dispense Refill    sodium chloride 5 % ophthalmic ointment Apply a generous amount to both lower eyelids every night             Past History     Past Medical History:  Past Medical History:   Diagnosis Date    PTSD (post-traumatic stress disorder)        Past Surgical History:  Past Surgical History:   Procedure Laterality Date    ORTHOPEDIC SURGERY      nose reconstruction    OTHER SURGICAL HISTORY      testicle surgery       Family History:  No family history on file.     Social History:  Social History     Tobacco Use    Smoking status: Every Day     Packs/day: 1.5     Types: Cigarettes    Smokeless tobacco: Current   Vaping Use    Vaping Use: Every day    Substances: Nicotine   Substance Use Topics    Alcohol use: Yes    Drug use: No       Allergies:  No Known Allergies      Review of

## 2023-12-24 ENCOUNTER — HOSPITAL ENCOUNTER (EMERGENCY)
Facility: HOSPITAL | Age: 33
Discharge: HOME | End: 2023-12-24
Attending: STUDENT IN AN ORGANIZED HEALTH CARE EDUCATION/TRAINING PROGRAM

## 2023-12-24 VITALS
OXYGEN SATURATION: 97 % | BODY MASS INDEX: 32.9 KG/M2 | RESPIRATION RATE: 20 BRPM | HEART RATE: 74 BPM | HEIGHT: 71 IN | TEMPERATURE: 99.8 F | WEIGHT: 235 LBS | DIASTOLIC BLOOD PRESSURE: 84 MMHG | SYSTOLIC BLOOD PRESSURE: 140 MMHG

## 2023-12-24 DIAGNOSIS — B34.9 VIRAL ILLNESS: Primary | ICD-10-CM

## 2023-12-24 LAB
FLUAV AG NPH QL IA: NEGATIVE
FLUBV AG NOSE QL IA: NEGATIVE
SARS-COV-2 RDRP RESP QL NAA+PROBE: NOT DETECTED
SOURCE: NORMAL

## 2023-12-24 PROCEDURE — 99283 EMERGENCY DEPT VISIT LOW MDM: CPT

## 2023-12-24 PROCEDURE — 87635 SARS-COV-2 COVID-19 AMP PRB: CPT

## 2023-12-24 PROCEDURE — 87804 INFLUENZA ASSAY W/OPTIC: CPT

## 2023-12-24 RX ORDER — BENZONATATE 100 MG/1
100 CAPSULE ORAL 2 TIMES DAILY PRN
Qty: 20 CAPSULE | Refills: 0 | Status: SHIPPED | OUTPATIENT
Start: 2023-12-24 | End: 2023-12-31

## 2023-12-24 NOTE — ED TRIAGE NOTES
Pt c/o of generalized body aches, fever, and chills that started today.  Pt reports fever of 103, last dose of Tylenol around 8pm.

## 2024-06-12 ENCOUNTER — HOSPITAL ENCOUNTER (EMERGENCY)
Facility: HOSPITAL | Age: 34
Discharge: HOME | End: 2024-06-12
Payer: COMMERCIAL

## 2024-06-12 VITALS
HEART RATE: 54 BPM | HEIGHT: 71 IN | OXYGEN SATURATION: 97 % | RESPIRATION RATE: 18 BRPM | DIASTOLIC BLOOD PRESSURE: 82 MMHG | TEMPERATURE: 98.6 F | BODY MASS INDEX: 32.06 KG/M2 | SYSTOLIC BLOOD PRESSURE: 139 MMHG | WEIGHT: 229 LBS

## 2024-06-12 DIAGNOSIS — M54.2 NECK PAIN: ICD-10-CM

## 2024-06-12 DIAGNOSIS — M43.6 TORTICOLLIS: Primary | ICD-10-CM

## 2024-06-12 DIAGNOSIS — M25.512 ACUTE PAIN OF LEFT SHOULDER: ICD-10-CM

## 2024-06-12 PROCEDURE — 6360000002 HC RX W HCPCS: Performed by: NURSE PRACTITIONER

## 2024-06-12 PROCEDURE — 99284 EMERGENCY DEPT VISIT MOD MDM: CPT

## 2024-06-12 PROCEDURE — 96372 THER/PROPH/DIAG INJ SC/IM: CPT

## 2024-06-12 RX ORDER — KETOROLAC TROMETHAMINE 15 MG/ML
30 INJECTION, SOLUTION INTRAMUSCULAR; INTRAVENOUS ONCE
Status: COMPLETED | OUTPATIENT
Start: 2024-06-12 | End: 2024-06-12

## 2024-06-12 RX ORDER — CYCLOBENZAPRINE HCL 10 MG
10 TABLET ORAL 3 TIMES DAILY PRN
Qty: 21 TABLET | Refills: 0 | Status: SHIPPED | OUTPATIENT
Start: 2024-06-12 | End: 2024-06-19

## 2024-06-12 RX ORDER — IBUPROFEN 600 MG/1
600 TABLET ORAL EVERY 6 HOURS PRN
Qty: 28 TABLET | Refills: 0 | Status: SHIPPED | OUTPATIENT
Start: 2024-06-12 | End: 2024-06-19

## 2024-06-12 RX ADMIN — KETOROLAC TROMETHAMINE 30 MG: 15 INJECTION, SOLUTION INTRAMUSCULAR; INTRAVENOUS at 13:17

## 2024-06-12 ASSESSMENT — PAIN SCALES - GENERAL: PAINLEVEL_OUTOF10: 8

## 2024-06-12 ASSESSMENT — PAIN - FUNCTIONAL ASSESSMENT: PAIN_FUNCTIONAL_ASSESSMENT: 0-10

## 2024-06-12 ASSESSMENT — PAIN DESCRIPTION - LOCATION: LOCATION: NECK

## 2024-06-12 NOTE — ED PROVIDER NOTES
EMERGENCY DEPARTMENT HISTORY AND PHYSICAL EXAM    2:21 PM      Date: 6/12/2024  Patient Name: Lindsay Sales    History of Presenting Illness     Chief Complaint   Patient presents with    Neck Pain    Shoulder Pain         History Provided By: Patient and medical chart review.    Additional History (Context): Lindsay Sales is a 33 y.o. male with   Past Medical History:   Diagnosis Date    PTSD (post-traumatic stress disorder)    who presents with complaints of left neck and shoulder pain.  States has been going on about 2 weeks.  States 1 night he went to sleep and he woke up with the pain.  Denies any injury or trauma.  States it started out to the left side of his neck and is going into his left shoulder a little.  Currently rating pain 8 out of 10.  Denies any numbness or tingling in his upper extremities.  Denies any headaches or blurred vision.  No other complaints or concerns.  Patient denies any fevers.  Patient denies taking any medications alleviate current symptoms.  Patient presents for evaluation.    PCP: No primary care provider on file.    No current facility-administered medications for this encounter.     Current Outpatient Medications   Medication Sig Dispense Refill    cyclobenzaprine (FLEXERIL) 10 MG tablet Take 1 tablet by mouth 3 times daily as needed for Muscle spasms 21 tablet 0    ibuprofen (ADVIL;MOTRIN) 600 MG tablet Take 1 tablet by mouth every 6 hours as needed for Pain 28 tablet 0    erythromycin (ROMYCIN) 5 MG/GM ophthalmic ointment 1 inch ribbon to both eyes twice daily for 5 days (Patient not taking: Reported on 12/24/2023) 1 each 0    sodium chloride 5 % ophthalmic ointment Apply a generous amount to both lower eyelids every night (Patient not taking: Reported on 12/24/2023)         Past History     Past Medical History:  Past Medical History:   Diagnosis Date    PTSD (post-traumatic stress disorder)        Past Surgical History:  Past Surgical History:   Procedure

## 2025-03-30 ENCOUNTER — APPOINTMENT (OUTPATIENT)
Facility: HOSPITAL | Age: 35
End: 2025-03-30
Payer: COMMERCIAL

## 2025-03-30 ENCOUNTER — HOSPITAL ENCOUNTER (EMERGENCY)
Facility: HOSPITAL | Age: 35
Discharge: HOME | End: 2025-03-30
Payer: COMMERCIAL

## 2025-03-30 VITALS
RESPIRATION RATE: 16 BRPM | SYSTOLIC BLOOD PRESSURE: 143 MMHG | HEART RATE: 97 BPM | WEIGHT: 235 LBS | OXYGEN SATURATION: 97 % | TEMPERATURE: 97.6 F | BODY MASS INDEX: 32.9 KG/M2 | HEIGHT: 71 IN | DIASTOLIC BLOOD PRESSURE: 99 MMHG

## 2025-03-30 DIAGNOSIS — S61.249A PUNCTURE WOUND OF FINGER WITH FOREIGN BODY WITHOUT DAMAGE TO NAIL, INITIAL ENCOUNTER: Primary | ICD-10-CM

## 2025-03-30 PROCEDURE — 96372 THER/PROPH/DIAG INJ SC/IM: CPT

## 2025-03-30 PROCEDURE — 2500000003 HC RX 250 WO HCPCS: Performed by: EMERGENCY MEDICINE

## 2025-03-30 PROCEDURE — 99283 EMERGENCY DEPT VISIT LOW MDM: CPT

## 2025-03-30 PROCEDURE — 64450 NJX AA&/STRD OTHER PN/BRANCH: CPT

## 2025-03-30 PROCEDURE — 6370000000 HC RX 637 (ALT 250 FOR IP): Performed by: EMERGENCY MEDICINE

## 2025-03-30 PROCEDURE — 73140 X-RAY EXAM OF FINGER(S): CPT

## 2025-03-30 PROCEDURE — 6360000002 HC RX W HCPCS: Performed by: EMERGENCY MEDICINE

## 2025-03-30 RX ORDER — BUPIVACAINE HYDROCHLORIDE 5 MG/ML
30 INJECTION, SOLUTION EPIDURAL; INTRACAUDAL; PERINEURAL
Status: COMPLETED | OUTPATIENT
Start: 2025-03-30 | End: 2025-03-30

## 2025-03-30 RX ORDER — CEFAZOLIN SODIUM 1 G/3ML
1000 INJECTION, POWDER, FOR SOLUTION INTRAMUSCULAR; INTRAVENOUS
Status: DISCONTINUED | OUTPATIENT
Start: 2025-03-30 | End: 2025-03-30

## 2025-03-30 RX ORDER — OXYCODONE AND ACETAMINOPHEN 5; 325 MG/1; MG/1
1 TABLET ORAL
Refills: 0 | Status: COMPLETED | OUTPATIENT
Start: 2025-03-30 | End: 2025-03-30

## 2025-03-30 RX ORDER — OXYCODONE AND ACETAMINOPHEN 5; 325 MG/1; MG/1
1 TABLET ORAL EVERY 8 HOURS PRN
Qty: 9 TABLET | Refills: 0 | Status: SHIPPED | OUTPATIENT
Start: 2025-03-30 | End: 2025-04-02

## 2025-03-30 RX ORDER — CEPHALEXIN 500 MG/1
500 CAPSULE ORAL 2 TIMES DAILY
Qty: 20 CAPSULE | Refills: 0 | Status: SHIPPED | OUTPATIENT
Start: 2025-03-30 | End: 2025-04-09

## 2025-03-30 RX ADMIN — OXYCODONE HYDROCHLORIDE AND ACETAMINOPHEN 1 TABLET: 5; 325 TABLET ORAL at 16:09

## 2025-03-30 RX ADMIN — BUPIVACAINE HYDROCHLORIDE 150 MG: 5 INJECTION, SOLUTION EPIDURAL; INTRACAUDAL; PERINEURAL at 16:20

## 2025-03-30 RX ADMIN — Medication 3 ML: at 16:27

## 2025-03-30 RX ADMIN — CEFAZOLIN 1000 MG: 1 INJECTION, POWDER, FOR SOLUTION INTRAMUSCULAR; INTRAVENOUS at 16:21

## 2025-03-30 ASSESSMENT — PAIN - FUNCTIONAL ASSESSMENT
PAIN_FUNCTIONAL_ASSESSMENT: 0-10
PAIN_FUNCTIONAL_ASSESSMENT: PREVENTS OR INTERFERES SOME ACTIVE ACTIVITIES AND ADLS

## 2025-03-30 ASSESSMENT — PAIN DESCRIPTION - LOCATION: LOCATION: FINGER (COMMENT WHICH ONE)

## 2025-03-30 ASSESSMENT — PAIN DESCRIPTION - ORIENTATION: ORIENTATION: LEFT

## 2025-03-30 ASSESSMENT — PAIN DESCRIPTION - PAIN TYPE: TYPE: ACUTE PAIN

## 2025-03-30 ASSESSMENT — PAIN SCALES - GENERAL: PAINLEVEL_OUTOF10: 10

## 2025-03-30 ASSESSMENT — PAIN DESCRIPTION - DESCRIPTORS: DESCRIPTORS: STABBING

## 2025-03-30 NOTE — ED TRIAGE NOTES
Patient was using a nail to remove pin from firearm. 3 inch nail impaled the tip of left middle finger. Patient is endorsing 10/10 pain. Last tetanus was approx 7 months ago.

## 2025-03-30 NOTE — ED PROVIDER NOTES
EMERGENCY DEPARTMENT HISTORY AND PHYSICAL EXAM      Date: 3/30/2025  Patient Name: Lindsay Sales    History of Presenting Illness     No chief complaint on file.      History (Context): Lindsay Sales is a 34 y.o. male  presents to the ED today with chief complaint of foreign body to his left and long finger.  He was cleaning his gun when he needed to get to a very small piece in the back to clean it and the piece fell and the gun shot off the nail into his left long finger.  His tetanus is up-to-date.      PCP: No primary care provider on file.    Current Facility-Administered Medications   Medication Dose Route Frequency Provider Last Rate Last Admin    BUPivacaine (PF) (MARCAINE) 0.5 % injection 150 mg  30 mL IntraDERmal NOW Gisell Rodriguez PA        ceFAZolin (ANCEF) injection 1,000 mg  1,000 mg IntraMUSCular NOW Gisell Rodriguez PA        oxyCODONE-acetaminophen (PERCOCET) 5-325 MG per tablet 1 tablet  1 tablet Oral NOW Gisell Rodriguez PA         Current Outpatient Medications   Medication Sig Dispense Refill    ibuprofen (ADVIL;MOTRIN) 600 MG tablet Take 1 tablet by mouth every 6 hours as needed for Pain 28 tablet 0    erythromycin (ROMYCIN) 5 MG/GM ophthalmic ointment 1 inch ribbon to both eyes twice daily for 5 days (Patient not taking: Reported on 12/24/2023) 1 each 0    sodium chloride 5 % ophthalmic ointment Apply a generous amount to both lower eyelids every night (Patient not taking: Reported on 12/24/2023)         Past History     Past Medical History:   Past Medical History:   Diagnosis Date    PTSD (post-traumatic stress disorder)        Past Surgical History:  Past Surgical History:   Procedure Laterality Date    ORTHOPEDIC SURGERY      nose reconstruction    OTHER SURGICAL HISTORY      testicle surgery       Family History:  No family history on file.    Social History:   Social History     Tobacco Use    Smoking status: Every Day     Current packs/day: 1.50     Types: Cigarettes